# Patient Record
Sex: FEMALE | Race: WHITE | NOT HISPANIC OR LATINO | Employment: OTHER | URBAN - METROPOLITAN AREA
[De-identification: names, ages, dates, MRNs, and addresses within clinical notes are randomized per-mention and may not be internally consistent; named-entity substitution may affect disease eponyms.]

---

## 2023-04-10 LAB
ALANINE AMINOTRANSFERASE (SGPT) (U/L) IN SER/PLAS: 17 U/L (ref 7–45)
ANION GAP IN SER/PLAS: 11 MMOL/L (ref 10–20)
CALCIDIOL (25 OH VITAMIN D3) (NG/ML) IN SER/PLAS: 47 NG/ML
CALCIUM (MG/DL) IN SER/PLAS: 9.9 MG/DL (ref 8.6–10.3)
CARBON DIOXIDE, TOTAL (MMOL/L) IN SER/PLAS: 28 MMOL/L (ref 21–32)
CHLORIDE (MMOL/L) IN SER/PLAS: 104 MMOL/L (ref 98–107)
CHOLESTEROL (MG/DL) IN SER/PLAS: 144 MG/DL (ref 0–199)
CHOLESTEROL IN HDL (MG/DL) IN SER/PLAS: 68 MG/DL
CHOLESTEROL/HDL RATIO: 2.1
CREATININE (MG/DL) IN SER/PLAS: 0.9 MG/DL (ref 0.5–1.05)
ERYTHROCYTE DISTRIBUTION WIDTH (RATIO) BY AUTOMATED COUNT: 13 % (ref 11.5–14.5)
ERYTHROCYTE MEAN CORPUSCULAR HEMOGLOBIN CONCENTRATION (G/DL) BY AUTOMATED: 31.9 G/DL (ref 32–36)
ERYTHROCYTE MEAN CORPUSCULAR VOLUME (FL) BY AUTOMATED COUNT: 94 FL (ref 80–100)
ERYTHROCYTES (10*6/UL) IN BLOOD BY AUTOMATED COUNT: 4.04 X10E12/L (ref 4–5.2)
GFR FEMALE: 63 ML/MIN/1.73M2
GLUCOSE (MG/DL) IN SER/PLAS: 98 MG/DL (ref 74–99)
HEMATOCRIT (%) IN BLOOD BY AUTOMATED COUNT: 37.9 % (ref 36–46)
HEMOGLOBIN (G/DL) IN BLOOD: 12.1 G/DL (ref 12–16)
LDL: 62 MG/DL (ref 0–99)
LEUKOCYTES (10*3/UL) IN BLOOD BY AUTOMATED COUNT: 7.1 X10E9/L (ref 4.4–11.3)
PLATELETS (10*3/UL) IN BLOOD AUTOMATED COUNT: 165 X10E9/L (ref 150–450)
POTASSIUM (MMOL/L) IN SER/PLAS: 4.2 MMOL/L (ref 3.5–5.3)
SODIUM (MMOL/L) IN SER/PLAS: 139 MMOL/L (ref 136–145)
THYROTROPIN (MIU/L) IN SER/PLAS BY DETECTION LIMIT <= 0.05 MIU/L: 0.57 MIU/L (ref 0.44–3.98)
TRIGLYCERIDE (MG/DL) IN SER/PLAS: 70 MG/DL (ref 0–149)
UREA NITROGEN (MG/DL) IN SER/PLAS: 14 MG/DL (ref 6–23)
VLDL: 14 MG/DL (ref 0–40)

## 2023-04-18 ENCOUNTER — OFFICE VISIT (OUTPATIENT)
Dept: PRIMARY CARE | Facility: CLINIC | Age: 83
End: 2023-04-18
Payer: MEDICARE

## 2023-04-18 VITALS
HEART RATE: 61 BPM | TEMPERATURE: 97.7 F | OXYGEN SATURATION: 98 % | DIASTOLIC BLOOD PRESSURE: 70 MMHG | WEIGHT: 122.8 LBS | BODY MASS INDEX: 21.41 KG/M2 | RESPIRATION RATE: 16 BRPM | SYSTOLIC BLOOD PRESSURE: 125 MMHG

## 2023-04-18 DIAGNOSIS — D69.6 THROMBOCYTOPENIA (CMS-HCC): ICD-10-CM

## 2023-04-18 DIAGNOSIS — N18.31 CHRONIC KIDNEY DISEASE, STAGE 3A (MULTI): ICD-10-CM

## 2023-04-18 DIAGNOSIS — I49.5 BRADY-TACHY SYNDROME (MULTI): ICD-10-CM

## 2023-04-18 PROBLEM — H61.20 CERUMEN IMPACTION: Status: ACTIVE | Noted: 2023-04-18

## 2023-04-18 PROBLEM — M21.42 FALLEN ARCHES: Status: ACTIVE | Noted: 2023-04-18

## 2023-04-18 PROBLEM — I10 HYPERTENSION: Status: ACTIVE | Noted: 2023-04-18

## 2023-04-18 PROBLEM — K21.9 ACID REFLUX: Status: ACTIVE | Noted: 2023-04-18

## 2023-04-18 PROBLEM — R09.82 POSTNASAL DRIP: Status: ACTIVE | Noted: 2023-04-18

## 2023-04-18 PROBLEM — R07.9 CHEST PAIN SYNDROME: Status: ACTIVE | Noted: 2023-04-18

## 2023-04-18 PROBLEM — I51.7 CARDIOMEGALY: Status: ACTIVE | Noted: 2023-04-18

## 2023-04-18 PROBLEM — G47.00 SLEEPLESSNESS: Status: ACTIVE | Noted: 2023-04-18

## 2023-04-18 PROBLEM — I34.0 MILD MITRAL REGURGITATION BY PRIOR ECHOCARDIOGRAM: Status: ACTIVE | Noted: 2023-04-18

## 2023-04-18 PROBLEM — K80.20 CHOLELITHIASIS: Status: ACTIVE | Noted: 2023-04-18

## 2023-04-18 PROBLEM — E78.5 HYPERLIPIDEMIA: Status: ACTIVE | Noted: 2023-04-18

## 2023-04-18 PROBLEM — F41.8 SITUATIONAL ANXIETY: Status: ACTIVE | Noted: 2023-04-18

## 2023-04-18 PROBLEM — M15.1 HEBERDEN NODES: Status: ACTIVE | Noted: 2023-04-18

## 2023-04-18 PROBLEM — M21.41 FALLEN ARCHES: Status: ACTIVE | Noted: 2023-04-18

## 2023-04-18 PROBLEM — I35.1 MODERATE AORTIC REGURGITATION: Status: ACTIVE | Noted: 2023-04-18

## 2023-04-18 PROBLEM — R19.4 BOWEL HABIT CHANGES: Status: ACTIVE | Noted: 2023-04-18

## 2023-04-18 PROBLEM — D64.9 ANEMIA: Status: ACTIVE | Noted: 2023-04-18

## 2023-04-18 PROBLEM — H60.92 OTITIS EXTERNA, LEFT: Status: ACTIVE | Noted: 2023-04-18

## 2023-04-18 PROBLEM — J06.9 ACUTE UPPER RESPIRATORY INFECTION: Status: ACTIVE | Noted: 2023-04-18

## 2023-04-18 PROBLEM — E03.9 HYPOTHYROIDISM: Status: ACTIVE | Noted: 2023-04-18

## 2023-04-18 PROBLEM — C44.90 SKIN CANCER: Status: ACTIVE | Noted: 2023-04-18

## 2023-04-18 PROBLEM — R26.89: Status: ACTIVE | Noted: 2023-04-18

## 2023-04-18 PROBLEM — I51.89 MILD LEFT VENTRICULAR SYSTOLIC DYSFUNCTION: Status: ACTIVE | Noted: 2023-04-18

## 2023-04-18 PROBLEM — M25.552 PAIN OF LEFT HIP JOINT: Status: ACTIVE | Noted: 2023-04-18

## 2023-04-18 PROBLEM — E55.9 VITAMIN D DEFICIENCY: Status: ACTIVE | Noted: 2023-04-18

## 2023-04-18 PROCEDURE — 3074F SYST BP LT 130 MM HG: CPT | Performed by: INTERNAL MEDICINE

## 2023-04-18 PROCEDURE — 1159F MED LIST DOCD IN RCRD: CPT | Performed by: INTERNAL MEDICINE

## 2023-04-18 PROCEDURE — 1036F TOBACCO NON-USER: CPT | Performed by: INTERNAL MEDICINE

## 2023-04-18 PROCEDURE — 99214 OFFICE O/P EST MOD 30 MIN: CPT | Performed by: INTERNAL MEDICINE

## 2023-04-18 PROCEDURE — 3078F DIAST BP <80 MM HG: CPT | Performed by: INTERNAL MEDICINE

## 2023-04-18 PROCEDURE — 1160F RVW MEDS BY RX/DR IN RCRD: CPT | Performed by: INTERNAL MEDICINE

## 2023-04-18 RX ORDER — AMIODARONE HYDROCHLORIDE 200 MG/1
100 TABLET ORAL DAILY
COMMUNITY

## 2023-04-18 RX ORDER — NITROGLYCERIN 0.3 MG/1
TABLET SUBLINGUAL
COMMUNITY
Start: 2022-07-27

## 2023-04-18 RX ORDER — ACETAMINOPHEN 500 MG
TABLET ORAL
COMMUNITY
Start: 2013-04-12

## 2023-04-18 RX ORDER — LEVOTHYROXINE SODIUM 75 UG/1
TABLET ORAL
COMMUNITY
End: 2023-04-26

## 2023-04-18 RX ORDER — ACETAMINOPHEN AND CODEINE PHOSPHATE 300; 30 MG/1; MG/1
1 TABLET ORAL EVERY 4 HOURS PRN
COMMUNITY
Start: 2022-11-25 | End: 2023-07-13 | Stop reason: ALTCHOICE

## 2023-04-18 RX ORDER — ATORVASTATIN CALCIUM 40 MG/1
40 TABLET, FILM COATED ORAL DAILY
COMMUNITY
End: 2023-08-21

## 2023-04-18 RX ORDER — BUPROPION HYDROCHLORIDE 150 MG/1
TABLET ORAL
COMMUNITY
Start: 2022-12-20 | End: 2023-07-13 | Stop reason: ALTCHOICE

## 2023-04-18 RX ORDER — FUROSEMIDE 20 MG/1
TABLET ORAL
COMMUNITY
End: 2023-09-22 | Stop reason: SDUPTHER

## 2023-04-18 RX ORDER — AMOXICILLIN 500 MG/1
1 CAPSULE ORAL
COMMUNITY
Start: 2022-11-25 | End: 2023-07-20 | Stop reason: ALTCHOICE

## 2023-04-18 RX ORDER — HYDROCODONE BITARTRATE AND ACETAMINOPHEN 5; 325 MG/1; MG/1
1 TABLET ORAL EVERY 4 HOURS PRN
COMMUNITY
Start: 2022-11-09 | End: 2023-09-27 | Stop reason: ALTCHOICE

## 2023-04-18 RX ORDER — LABETALOL 200 MG/1
TABLET, FILM COATED ORAL
COMMUNITY
End: 2023-07-13 | Stop reason: SDUPTHER

## 2023-04-18 RX ORDER — FLUTICASONE PROPIONATE 50 MCG
1 SPRAY, SUSPENSION (ML) NASAL DAILY PRN
COMMUNITY

## 2023-04-18 RX ORDER — AMOXICILLIN AND CLAVULANATE POTASSIUM 875; 125 MG/1; MG/1
1 TABLET, FILM COATED ORAL 2 TIMES DAILY
Qty: 10 TABLET | Refills: 0 | COMMUNITY
Start: 2022-11-06 | End: 2022-11-11

## 2023-04-18 RX ORDER — LISINOPRIL 20 MG/1
TABLET ORAL
COMMUNITY
End: 2023-06-22

## 2023-04-18 RX ORDER — ASCORBIC ACID 500 MG
2 TABLET ORAL DAILY
COMMUNITY
Start: 2020-10-01

## 2023-04-18 RX ORDER — MELATONIN 3 MG
1 CAPSULE ORAL NIGHTLY
COMMUNITY
Start: 2020-10-01

## 2023-04-18 ASSESSMENT — PATIENT HEALTH QUESTIONNAIRE - PHQ9
1. LITTLE INTEREST OR PLEASURE IN DOING THINGS: NOT AT ALL
2. FEELING DOWN, DEPRESSED OR HOPELESS: NOT AT ALL
SUM OF ALL RESPONSES TO PHQ9 QUESTIONS 1 AND 2: 0

## 2023-04-18 ASSESSMENT — ENCOUNTER SYMPTOMS
DEPRESSION: 0
LOSS OF SENSATION IN FEET: 0
OCCASIONAL FEELINGS OF UNSTEADINESS: 0

## 2023-04-18 NOTE — PROGRESS NOTES
Subjective   Patient ID: Aleena Bruce is a 83 y.o. female who presents for Follow-up.    Here for follow up   Hearing a bit worse  No chest pain  Walking dogs in her neighborhood       Review of Systems    Objective   /70 (BP Location: Left arm, Patient Position: Sitting)   Pulse 61   Temp 36.5 °C (97.7 °F)   Resp 16   Wt 55.7 kg (122 lb 12.8 oz)   SpO2 98%   BMI 21.41 kg/m²     Physical Exam  Vitals reviewed.   Constitutional:       Appearance: Normal appearance.   HENT:      Head: Normocephalic and atraumatic.   Eyes:      General: No scleral icterus.        Right eye: No discharge.         Left eye: No discharge.      Extraocular Movements: Extraocular movements intact.      Conjunctiva/sclera: Conjunctivae normal.      Pupils: Pupils are equal, round, and reactive to light.   Cardiovascular:      Rate and Rhythm: Normal rate and regular rhythm.      Pulses: Normal pulses.      Heart sounds: Normal heart sounds. No murmur heard.  Pulmonary:      Effort: Pulmonary effort is normal.      Breath sounds: Normal breath sounds. No wheezing or rhonchi.   Musculoskeletal:         General: No deformity or signs of injury. Normal range of motion.      Cervical back: Normal range of motion and neck supple. No rigidity or tenderness.   Lymphadenopathy:      Cervical: No cervical adenopathy.   Skin:     General: Skin is warm and dry.      Findings: No rash.   Neurological:      General: No focal deficit present.      Mental Status: She is alert and oriented to person, place, and time. Mental status is at baseline.      Cranial Nerves: No cranial nerve deficit.      Sensory: No sensory deficit.      Gait: Gait normal.   Psychiatric:         Mood and Affect: Mood normal.         Behavior: Behavior normal.         Thought Content: Thought content normal.         Judgment: Judgment normal.       Assessment/Plan   Problem List Items Addressed This Visit    None       Living situation-she lives with her 2 grown  children, her son and daughter, who coincidentally have both lost their spouses. She enjoys walking her dogs daily. She remains independent continues to drive. She typically takes a 2-hour nap after lunch.  Still has 1 dog            She walks several dogs for her neighbors     Chest pain syndrome s/p ER evaluation 7/26/22 - Recent chest pains. Visited the ER. All tests were negative. She is scheduled to do a coronary angiogram.            Negative cardiac eval in 2022.            will see her cardiologist in 11/23     Hypertension- labile/dyslipidemia- Improved upon recheck. Functionally doing very well without dizziness or chest pain. Stable of late. no orthostasis. will check labs regularly     Fitness routine - she walks her dog, 2-3 walks daily, about 25 min in the am, and then 2 shorter walks. She walks year round.S he continues walking several dogs around the neighborhood daily.      Tachybradycardia syndrome/valvular heart disease-she will continue annual visits and cardiology with Dr. Segundo Clark. She remains asymptomatic, without any dizziness or syncopal episodes which were occurring after urination. Echo and labs followed she notes.    No recent cardiac symptoms walking routine walking dogs quite good daily. Stable symptoms. she'll follow with him each December. Unremarkable CT coronary artery angiogram and nuclear stress test 10/22        Mild chronic kidney disease-stage IIIA- we will continue to follow. Stable     Nutritional concerns-weight is down several pounds this spring but she is avoiding salt and overall eating better she states     Acid reflux / dyspepsia - Improved. Off meds. Not active issue.   Occasionally problematic w/ accompanying dyspepsia. Using Pepcid AC for relief- perhaps once weekly       Cholelithiasis- saw Dr. Santana 3/18 after single episode of cholelithiasis. Rather than surgery, they've opted to meet again in 6 months obviously sooner with symptoms. No recent symptoms she  notes. 2018 abdominal ultrasound report reviewed-simple cholelithiasis- asymptomatic follow   no postprandial symptoms     Liver Benign cyst incidentally noted on ultrasound.     Balance concerns - more of a concern to her. PT recommended/ordered last time. Presently doing well, walking mult.dogs mult. times daily. Overall improved. She uses a special collar on her younger dog, and trakx boot covers in ice.             She will continue fall prevention    Musculoskeletal chest pain-discussed at her  visit-she really does not do any upper body workouts-reviewed the anatomy, and the need to begin a upper body stretching routine several days weekly     Left hip pain/left knee crepitance- caution with walking with her new dog   She appears to be developing a bit of a valgus deformity suggestive of advanced left knee arthritis. She has no pain, so we will continue to follow. Again extreme caution walking remains a priority she understands.     Hand arthritis-advanced-she still enjoys gardening and chores around the house     Hypothyroidism-she will continue her Synthroid and check thyroid labs at least semiannually     Thrombocytopenia- stable we will follow     Seasonal affective disorder/Situational depression- spouse  in 2015; Has a great set of friends. Busy walking dogs in neighborhood. SSRI helpful-but she had to stop the Lexapro with loose bowel movements. Wellbutrin tried but this seemed to cause memory issues so she stopped it.      She called mid winter with issues with anxiety and feelings of depression-Lexapro suggested but she stopped it after a week or so.  It did not agree with her.  She is doing much better.  Enjoys springtime, walking her dogs, and enjoys her neighbors     GYN care/Annual mammograms / family Hx breast cancer-she will continue at least until age 78 each winter per GYN- at [facility]. She now follows w/ Dr. Ventura's successor after he retired at [facility], [place] each . who  orders her mammo then too.     Annual mammogram - each winter at [facility]. updated Mar '21. She will be 83 soon-we will discontinue at this point     Bone density concerns - per GYN several yrs ago OK. no family Hx osteoporosis. she's not inclined to repeat.     Bowel movements - change in frequency and calibre. Accompanying occasional LLQ pains. Recommended f/u with Gastro and request a CT colonography.     Colon cancer screening- colonoscopy normal 2007. She is over 78 so this will not need to be repeated unless she has symptoms.   She is currently experiencing bowel disruptions and discomfort. She is unwilling to do a colonoscopy. She has cancelled her last appointment. Suggested a CT colonography. She will f/u w/ Gastro at her next appt in 8/22.     Vitamin D deficiency/Bone density concerns-she refuses further evaluation or treatment for osteoporosis. She will continue the vitamin D however.     Sleeplessness / anxiety - Doing well presently w/ melatonin nightly        Cerumen care- She will continue follow-up with concerns     History of skin cancer-she follows with Dr. Rodríguez now twice a year. Appt. in Dec.     Hx bilateral cataracts w/ lenses - she'll consider an eye exam , as she has reading glasses, and it's been a few yrs     Dental care - she plans to f/u w/ concerns. Needs to get in.     TMJ joint pain w/ poor dentition- ongoing pain for approximately 2 months. 3 extractions completed. Intractable TMJ pains. She intends to f/u with an oral surgeon who is a friend. Recommended TMJ therapy exercises.     Hearing loss-she admits that her kids say that the TV is pretty loud.           Considering taking out options at [employer]     Flu shot each fall. updated 10/22     Covid series completed- w/ booster too. Additional booster shot updated 10/22.     Shingrix series completed     Follow-up in 5 months, sooner as needed

## 2023-04-18 NOTE — PROGRESS NOTES
Subjective   Patient ID: Aleena Bruce is a 83 y.o. female who presents for Follow-up.    HPI     Review of Systems    Objective   /70 (BP Location: Left arm, Patient Position: Sitting)   Pulse 61   Temp 36.5 °C (97.7 °F)   Resp 16   Wt 55.7 kg (122 lb 12.8 oz)   SpO2 98%   BMI 21.41 kg/m²     Physical Exam    Assessment/Plan

## 2023-04-25 DIAGNOSIS — E03.8 OTHER SPECIFIED HYPOTHYROIDISM: Primary | ICD-10-CM

## 2023-04-26 RX ORDER — LEVOTHYROXINE SODIUM 75 UG/1
75 TABLET ORAL
Qty: 90 TABLET | Refills: 0 | Status: SHIPPED | OUTPATIENT
Start: 2023-04-26 | End: 2023-05-01

## 2023-04-27 DIAGNOSIS — H91.90 HEARING LOSS, UNSPECIFIED HEARING LOSS TYPE, UNSPECIFIED LATERALITY: Primary | ICD-10-CM

## 2023-05-01 DIAGNOSIS — E03.8 OTHER SPECIFIED HYPOTHYROIDISM: ICD-10-CM

## 2023-05-01 RX ORDER — LEVOTHYROXINE SODIUM 75 UG/1
75 TABLET ORAL
Qty: 90 TABLET | Refills: 1 | Status: SHIPPED | OUTPATIENT
Start: 2023-05-01 | End: 2023-11-09 | Stop reason: SDUPTHER

## 2023-06-22 DIAGNOSIS — I10 ESSENTIAL HYPERTENSION WITH GOAL BLOOD PRESSURE LESS THAN 130/85: Primary | ICD-10-CM

## 2023-06-22 RX ORDER — LISINOPRIL 20 MG/1
20 TABLET ORAL DAILY
Qty: 180 TABLET | Refills: 3 | Status: SHIPPED | OUTPATIENT
Start: 2023-06-22 | End: 2023-07-20 | Stop reason: SDUPTHER

## 2023-07-07 ENCOUNTER — DOCUMENTATION (OUTPATIENT)
Dept: PRIMARY CARE | Facility: CLINIC | Age: 83
End: 2023-07-07
Payer: MEDICARE

## 2023-07-07 ENCOUNTER — TELEPHONE (OUTPATIENT)
Dept: PRIMARY CARE | Facility: CLINIC | Age: 83
End: 2023-07-07
Payer: MEDICARE

## 2023-07-07 NOTE — TELEPHONE ENCOUNTER
Pt had fainting accident 2 weeks ago - broke hip - has been pinned at [facility] - will be discharged from rehab today - needs an f/u  within the next 2 weeks - please advise if she can be added to schedule or if ok to schedule with Dr Whitaker or Rocio.

## 2023-07-10 ENCOUNTER — PATIENT OUTREACH (OUTPATIENT)
Dept: CARE COORDINATION | Facility: CLINIC | Age: 83
End: 2023-07-10
Payer: MEDICARE

## 2023-07-10 RX ORDER — DIBASIC SODIUM PHOSPHATE, MONOBASIC POTASSIUM PHOSPHATE AND MONOBASIC SODIUM PHOSPHATE 852; 155; 130 MG/1; MG/1; MG/1
1 TABLET ORAL 4 TIMES DAILY
COMMUNITY
End: 2023-10-27 | Stop reason: ALTCHOICE

## 2023-07-10 RX ORDER — OXYCODONE HYDROCHLORIDE 5 MG/1
5 TABLET ORAL EVERY 8 HOURS PRN
COMMUNITY
Start: 2023-07-06 | End: 2023-09-27 | Stop reason: ALTCHOICE

## 2023-07-10 RX ORDER — ENOXAPARIN SODIUM 100 MG/ML
30 INJECTION SUBCUTANEOUS DAILY
COMMUNITY
End: 2023-07-13 | Stop reason: ALTCHOICE

## 2023-07-10 RX ORDER — LISINOPRIL 20 MG/1
20 TABLET ORAL 2 TIMES DAILY
COMMUNITY
Start: 2015-12-21 | End: 2023-12-13 | Stop reason: SDUPTHER

## 2023-07-10 NOTE — PROGRESS NOTES
Discharge Facility: [facility]  Discharge Diagnosis: rehab following L femur fracture and repair  Admission Date:6/28/23  Discharge Date: 7/7/23    PCP Appointment Date: 7/20/23  Specialist Appointment Date: 7/11/23  Hospital Encounter and Summary: Linked   See discharge assessment below for further details    Engagement  Call Start Time: 1423 (7/10/2023  2:34 PM)    Medications  Medications reviewed with patient/caregiver?: Yes (7/10/2023  2:34 PM)  Is the patient having any side effects they believe may be caused by any medication additions or changes?: No (7/10/2023  2:34 PM)  Does the patient have all medications ordered at discharge?: Yes (7/10/2023  2:34 PM)  Care Management Interventions: No intervention needed (7/10/2023  2:34 PM)  Prescription Comments: back on lisinopril 20mg twice daily, off lasix, on lovenox through Wed, on phosphorus supplement (7/10/2023  2:34 PM)  Is the patient taking all medications as directed (includes completed medication regime)?: Yes (7/10/2023  2:34 PM)    Appointments  Does the patient have a primary care provider?: Yes (7/10/2023  2:34 PM)  Care Management Interventions: Verified appointment date/time/provider (7/10/2023  2:34 PM)  Has the patient kept scheduled appointments due by today?: Yes (7/10/2023  2:34 PM)  Care Management Interventions: Educated on importance of keeping appointment (7/10/2023  2:34 PM)    Self Management  What Durable Medical Equipment (DME) was ordered?: walker and bedside commode (7/10/2023  2:34 PM)  Has all Durable Medical Equipment (DME) been delivered?: Yes (7/10/2023  2:34 PM)    Patient Teaching  Does the patient have access to their discharge instructions?: Yes (7/10/2023  2:34 PM)  Care Management Interventions: Reviewed instructions with patient (7/10/2023  2:34 PM)  What is the patient's perception of their health status since discharge?: Improving (7/10/2023  2:34 PM)  Is the patient/caregiver able to teach back  the hierarchy of who to call/visit for symptoms/problems? PCP, Specialist, Home Health nurse, Urgent Care, ED, 911: Yes (7/10/2023  2:34 PM)    Wrap Up  Wrap Up Additional Comments: Josef is doing well awaiting to be set up with outpatient PTOT she will call by Wed if she has not heard from them. Has assistance at home. Notes inpatient states to avoid midodrine and consider stockings and abd binder if dizziness/syncope reoccurs. (7/10/2023  2:34 PM)  Call End Time: 1430 (7/10/2023  2:34 PM)

## 2023-07-11 NOTE — TELEPHONE ENCOUNTER
Pt wanted to speak with Dr. Rose before appointment. She is concerned about BP- today current reading was 222/87  Pt didn't know what to do about it?    Please call pt at [phone]

## 2023-07-11 NOTE — TELEPHONE ENCOUNTER
Patient got in touch with Cardiologist Dr. Clark, and due to her current BP readings (222/87), was advised to go to ER. Patient will be going to ER in [facility].

## 2023-07-12 NOTE — TELEPHONE ENCOUNTER
Pt is asking to speak with Dr Rose personally in regards to her BP concerns. It is elevated again. Please advise she has a change in phone numbers. Thank you!

## 2023-07-13 RX ORDER — LABETALOL 200 MG/1
TABLET, FILM COATED ORAL
COMMUNITY
Start: 2023-07-13 | End: 2023-07-20 | Stop reason: DRUGHIGH

## 2023-07-13 NOTE — TELEPHONE ENCOUNTER
Spoke with patient earlier this afternoon who will follow her blood pressure twice daily and if her systolic pressure is over 150 she will take an extra half labetalol.  She will follow-up with me next week as scheduled.  She has had recent difficulties with labile blood pressure readings, recently.  She will use caution standing and again follow her blood pressure twice daily she will follow-up next Thursday

## 2023-07-20 ENCOUNTER — OFFICE VISIT (OUTPATIENT)
Dept: PRIMARY CARE | Facility: CLINIC | Age: 83
End: 2023-07-20
Payer: MEDICARE

## 2023-07-20 VITALS
SYSTOLIC BLOOD PRESSURE: 170 MMHG | TEMPERATURE: 98.4 F | RESPIRATION RATE: 16 BRPM | DIASTOLIC BLOOD PRESSURE: 73 MMHG | WEIGHT: 118.2 LBS | HEART RATE: 66 BPM | OXYGEN SATURATION: 97 % | BODY MASS INDEX: 20.94 KG/M2

## 2023-07-20 DIAGNOSIS — I10 LABILE ESSENTIAL HYPERTENSION: ICD-10-CM

## 2023-07-20 DIAGNOSIS — S72.012A: ICD-10-CM

## 2023-07-20 DIAGNOSIS — Z78.0 POSTMENOPAUSAL STATE: Primary | ICD-10-CM

## 2023-07-20 DIAGNOSIS — G89.11 ACUTE TRAUMATIC PAIN: ICD-10-CM

## 2023-07-20 DIAGNOSIS — S72.002A HIP FRACTURE, LEFT, CLOSED, INITIAL ENCOUNTER (MULTI): ICD-10-CM

## 2023-07-20 DIAGNOSIS — M15.9 OSTEOARTHRITIS OF MULTIPLE JOINTS, UNSPECIFIED OSTEOARTHRITIS TYPE: ICD-10-CM

## 2023-07-20 PROBLEM — D62 ACUTE POSTOPERATIVE ANEMIA DUE TO EXPECTED BLOOD LOSS: Status: ACTIVE | Noted: 2023-06-27

## 2023-07-20 PROBLEM — S00.03XA HEMATOMA OF SCALP: Status: ACTIVE | Noted: 2023-06-29

## 2023-07-20 PROBLEM — H93.12 TINNITUS OF LEFT EAR: Status: ACTIVE | Noted: 2023-07-20

## 2023-07-20 PROBLEM — H90.3 ASYMMETRIC SNHL (SENSORINEURAL HEARING LOSS): Status: ACTIVE | Noted: 2023-07-20

## 2023-07-20 PROBLEM — M19.90 OSTEOARTHRITIS: Status: ACTIVE | Noted: 2020-03-09
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
## 2023-07-20 PROBLEM — E83.39 HYPOPHOSPHATEMIA: Status: ACTIVE | Noted: 2023-06-28

## 2023-07-20 PROBLEM — S76.319A HAMSTRING STRAIN: Status: ACTIVE | Noted: 2018-05-09

## 2023-07-20 PROCEDURE — 1126F AMNT PAIN NOTED NONE PRSNT: CPT | Performed by: INTERNAL MEDICINE

## 2023-07-20 PROCEDURE — 3078F DIAST BP <80 MM HG: CPT | Performed by: INTERNAL MEDICINE

## 2023-07-20 PROCEDURE — 1036F TOBACCO NON-USER: CPT | Performed by: INTERNAL MEDICINE

## 2023-07-20 PROCEDURE — 3077F SYST BP >= 140 MM HG: CPT | Performed by: INTERNAL MEDICINE

## 2023-07-20 PROCEDURE — 99495 TRANSJ CARE MGMT MOD F2F 14D: CPT | Performed by: INTERNAL MEDICINE

## 2023-07-20 PROCEDURE — 1160F RVW MEDS BY RX/DR IN RCRD: CPT | Performed by: INTERNAL MEDICINE

## 2023-07-20 PROCEDURE — 1159F MED LIST DOCD IN RCRD: CPT | Performed by: INTERNAL MEDICINE

## 2023-07-20 RX ORDER — LABETALOL 200 MG/1
300 TABLET, FILM COATED ORAL 2 TIMES DAILY
Status: SHIPPED | COMMUNITY
Start: 2023-07-20 | End: 2023-07-31 | Stop reason: SDUPTHER

## 2023-07-20 RX ORDER — DEXTROMETHORPHAN HYDROBROMIDE, GUAIFENESIN 5; 100 MG/5ML; MG/5ML
1300 LIQUID ORAL 2 TIMES DAILY PRN
Qty: 30 TABLET | Refills: 0 | COMMUNITY
Start: 2023-07-20

## 2023-07-20 ASSESSMENT — ENCOUNTER SYMPTOMS
OCCASIONAL FEELINGS OF UNSTEADINESS: 1
LOSS OF SENSATION IN FEET: 0
DEPRESSION: 0

## 2023-07-20 ASSESSMENT — PATIENT HEALTH QUESTIONNAIRE - PHQ9
SUM OF ALL RESPONSES TO PHQ9 QUESTIONS 1 AND 2: 0
1. LITTLE INTEREST OR PLEASURE IN DOING THINGS: NOT AT ALL
2. FEELING DOWN, DEPRESSED OR HOPELESS: NOT AT ALL

## 2023-07-20 NOTE — PROGRESS NOTES
"Patient: Aleena Bruce  : 1940  PCP: Checo Rose MD  MRN: [id number]  Program: No linked episodes     Aleena Bruce is a 83 y.o. female presenting today for follow-up after being discharged from the hospital 13 days ago. The main problem requiring admission was acute rehab after left hip fracture in [place] after a fall.  The discharge summary and/or Transitional Care Management documentation was reviewed. Medication reconciliation was performed as indicated via the \"Mikie as Reviewed\" timestamp.     Aleena Bruce was contacted by Transitional Care Management services two days after her discharge. This encounter and supporting documentation was reviewed.    The complexity of medical decision making for this patient's transitional care is moderate.    Physical Exam  Vitals reviewed.   Constitutional:       Appearance: Normal appearance.      Comments: Well-appearing female, appearing younger than her stated age ambulating easily with a walker   HENT:      Head: Normocephalic and atraumatic.   Eyes:      General: No scleral icterus.        Right eye: No discharge.         Left eye: No discharge.      Extraocular Movements: Extraocular movements intact.      Conjunctiva/sclera: Conjunctivae normal.      Pupils: Pupils are equal, round, and reactive to light.   Cardiovascular:      Rate and Rhythm: Normal rate and regular rhythm.      Pulses: Normal pulses.      Heart sounds: Murmur heard.   Pulmonary:      Effort: Pulmonary effort is normal.      Breath sounds: Normal breath sounds. No wheezing or rhonchi.   Musculoskeletal:         General: No deformity or signs of injury. Normal range of motion.      Cervical back: Normal range of motion and neck supple. No rigidity or tenderness.   Lymphadenopathy:      Cervical: No cervical adenopathy.   Skin:     General: Skin is warm and dry.      Findings: No rash.   Neurological:      General: No focal deficit present.      Mental Status: She is alert and oriented to " person, place, and time. Mental status is at baseline.      Cranial Nerves: No cranial nerve deficit.      Sensory: No sensory deficit.      Gait: Gait normal.   Psychiatric:         Mood and Affect: Mood normal.         Behavior: Behavior normal.         Thought Content: Thought content normal.         Judgment: Judgment normal.         Assessment/Plan   Problem List Items Addressed This Visit       Acute traumatic pain    Hip fracture, left, closed, initial encounter (CMS/[facility])    Osteoarthritis    Unspecified intracapsular fracture of left femur, initial encounter for closed fracture (CMS/[facility])     Other Visit Diagnoses       Postmenopausal state    -  Primary    Relevant Orders    XR DEXA bone density    Labile essential hypertension                Review of Systems    No family history on file.    Engagement  Call Start Time: 1423 (7/10/2023  2:34 PM)    Medications  Medications reviewed with patient/caregiver?: Yes (7/10/2023  2:34 PM)  Is the patient having any side effects they believe may be caused by any medication additions or changes?: No (7/10/2023  2:34 PM)  Does the patient have all medications ordered at discharge?: Yes (7/10/2023  2:34 PM)  Care Management Interventions: No intervention needed (7/10/2023  2:34 PM)  Prescription Comments: back on lisinopril 20mg twice daily, off lasix, on lovenox through Wed, on phosphorus supplement (7/10/2023  2:34 PM)  Is the patient taking all medications as directed (includes completed medication regime)?: Yes (7/10/2023  2:34 PM)    Appointments  Does the patient have a primary care provider?: Yes (7/10/2023  2:34 PM)  Care Management Interventions: Verified appointment date/time/provider (7/10/2023  2:34 PM)  Has the patient kept scheduled appointments due by today?: Yes (7/10/2023  2:34 PM)  Care Management Interventions: Educated on importance of keeping appointment (7/10/2023  2:34 PM)    Self Management  What Durable Medical Equipment (DME) was ordered?:  walker and bedside commode (7/10/2023  2:34 PM)  Has all Durable Medical Equipment (DME) been delivered?: Yes (7/10/2023  2:34 PM)    Patient Teaching  Does the patient have access to their discharge instructions?: Yes (7/10/2023  2:34 PM)  Care Management Interventions: Reviewed instructions with patient (7/10/2023  2:34 PM)  What is the patient's perception of their health status since discharge?: Improving (7/10/2023  2:34 PM)  Is the patient/caregiver able to teach back the hierarchy of who to call/visit for symptoms/problems? PCP, Specialist, Home Health nurse, Urgent Care, ED, 911: Yes (7/10/2023  2:34 PM)    Wrap Up  Wrap Up Additional Comments: Josef is doing well awaiting to be set up with outpatient PTOT she will call by Wed if she has not heard from them. Has assistance at home. Notes inpatient states to avoid midodrine and consider stockings and abd binder if dizziness/syncope reoccurs. (7/10/2023  2:34 PM)  Call End Time: 1430 (7/10/2023  2:34 PM)        No follow-ups on file.    Patient was recently discharged from an inpatient facility. Discharge medication list reviewed and reconciled with her electronic medical record medication list. Discharge orders reviewed, and we'll continue to follow active medical problems as outlined in this note.    Syncopal episode-while prolonged standing in [place], with her hands elevated-she will review with her cardiologist Dr. Clark at her appointment next week    Left hip fracture-following fall in [place] 6/26/2023.  Uncomplicated postop course so far.  She will follow-up with orthopedics as planned    Rehab-she was admitted to rehab 6/28/2023, discharged 7/7/2023, 13 days ago.  She is using a walker.  She will continue her PT at [facility]    Labile hypertension-she will continue labetalol 1 and half pills twice daily and review with Dr. Clark next week.  She will get a new home blood pressure machine and check her blood pressure midday and call if consistently over  135 systolic.    Decreased left ear hearing-she saw Dr. 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  At this point she states that hearing aid will help amplify this and so limited options     Living situation-she lives with her 2 grown children, her son and daughter, who coincidentally have both lost their spouses. She enjoys walking her dogs daily. She remains independent continues to drive. She typically takes a 2-hour nap after lunch.  Still has 1 dog            She walks several dogs for her neighbors     Chest pain syndrome s/p ER evaluation 7/26/22 - Recent chest pains. Visited the ER. All tests were negative. She is scheduled to do a coronary angiogram.            Negative cardiac eval in 2022.            will see her cardiologist in 11/23     Hypertension- labile/dyslipidemia- Improved upon recheck. Functionally doing very well without dizziness or chest pain. Stable of late. no orthostasis. will check labs regularly     Fitness routine - she walks her dog, 2-3 walks daily, about 25 min in the am, and then 2 shorter walks. She walks year round.S he continues walking several dogs around the neighborhood daily.      Tachybradycardia syndrome/valvular heart disease-she will continue annual visits and cardiology with Dr. Segundo Clark. She remains asymptomatic, without any dizziness or syncopal episodes which were occurring after urination. Echo and labs followed she notes.    No recent cardiac symptoms walking routine walking dogs quite good daily. Stable symptoms. she'll follow with him each December. Unremarkable CT coronary artery angiogram and nuclear stress test 10/22        Mild chronic kidney disease-stage IIIA- we will continue to follow. Stable     Nutritional concerns-weight is down several pounds this spring but she is avoiding salt and overall eating better she states     Acid reflux / dyspepsia - Improved. Off meds. Not active issue.   Occasionally problematic w/ accompanying  dyspepsia. Using Pepcid AC for relief- perhaps once weekly       Cholelithiasis- saw Dr. Santana 3/18 after single episode of cholelithiasis. Rather than surgery, they've opted to meet again in 6 months obviously sooner with symptoms. No recent symptoms she notes. 2018 abdominal ultrasound report reviewed-simple cholelithiasis- asymptomatic follow   no postprandial symptoms     Liver Benign cyst incidentally noted on ultrasound.     Balance concerns - more of a concern to her. PT recommended/ordered last time. Presently doing well, walking mult.dogs mult. times daily. Overall improved. She uses a special collar on her younger dog, and trakx boot covers in ice.             She will continue fall prevention    Musculoskeletal chest pain-discussed at her  visit-she really does not do any upper body workouts-reviewed the anatomy, and the need to begin a upper body stretching routine several days weekly     Left hip pain/left knee crepitance- caution with walking with her new dog   She appears to be developing a bit of a valgus deformity suggestive of advanced left knee arthritis. She has no pain, so we will continue to follow. Again extreme caution walking remains a priority she understands.     Hand arthritis-advanced-she still enjoys gardening and chores around the house     Hypothyroidism-she will continue her Synthroid and check thyroid labs at least semiannually     Thrombocytopenia- stable we will follow     Seasonal affective disorder/Situational depression- spouse  in 2015; Has a great set of friends. Busy walking dogs in neighborhood. SSRI helpful-but she had to stop the Lexapro with loose bowel movements. Wellbutrin tried but this seemed to cause memory issues so she stopped it.      She called mid winter with issues with anxiety and feelings of depression-Lexapro suggested but she stopped it after a week or so.  It did not agree with her.  She is doing much better.  Enjoys springtime,  walking her dogs, and enjoys her neighbors     GYN care/Annual mammograms / family Hx breast cancer-she will continue at least until age 78 each winter per GYN- at [facility]. She now follows w/ Dr. Ventura's successor after he retired at [facility] each Jan. who orders her mammo then too.     Annual mammogram - each winter at [facility]. updated Mar '21. She will be 83 soon-we will discontinue at this point     Bone density concerns - per GYN several yrs ago OK. no family Hx osteoporosis. she's not inclined to repeat.     Bowel movements - change in frequency and calibre. Accompanying occasional LLQ pains. Recommended f/u with Gastro and request a CT colonography.     Colon cancer screening- colonoscopy normal 2007. She is over 78 so this will not need to be repeated unless she has symptoms.   She is currently experiencing bowel disruptions and discomfort. She is unwilling to do a colonoscopy. She has cancelled her last appointment. Suggested a CT colonography. She will f/u w/ Gastro at her next appt in 8/22.     Vitamin D deficiency/Bone density concerns-she refuses further evaluation or treatment for osteoporosis. She will continue the vitamin D however.     Sleeplessness / anxiety - Doing well presently w/ melatonin nightly        Cerumen care- She will continue follow-up with concerns     History of skin cancer-she follows with Dr. Rodríguez now twice a year. Appt. in Dec.     Hx bilateral cataracts w/ lenses - she'll consider an eye exam , as she has reading glasses, and it's been a few yrs     Dental care - she plans to f/u w/ concerns. Needs to get in.     TMJ joint pain w/ poor dentition- ongoing pain for approximately 2 months. 3 extractions completed. Intractable TMJ pains. She intends to f/u with an oral surgeon who is a friend. Recommended TMJ therapy exercises.     Hearing loss-she admits that her kids say that the TV is pretty loud.           Considering taking out options at [employer]     Flu shot each fall. updated  10/22     Covid series completed- w/ booster too. Additional booster shot updated 10/22.     Shingrix series completed     Follow-up in 5 months, sooner as needed

## 2023-07-25 ENCOUNTER — PATIENT OUTREACH (OUTPATIENT)
Dept: CARE COORDINATION | Facility: CLINIC | Age: 83
End: 2023-07-25
Payer: MEDICARE

## 2023-07-25 NOTE — PROGRESS NOTES
Unable to reach patient for call back after patient's follow up appointment with PCP.   KILEYM with call back number for patient to call if needed   If no voicemail available call attempts x 2 were made to contact the patient to assist with any questions or concerns patient may have.

## 2023-07-31 DIAGNOSIS — I10 PRIMARY HYPERTENSION: Primary | ICD-10-CM

## 2023-07-31 RX ORDER — LABETALOL 200 MG/1
300 TABLET, FILM COATED ORAL 2 TIMES DAILY
Qty: 270 TABLET | Refills: 3 | Status: SHIPPED | OUTPATIENT
Start: 2023-07-31 | End: 2023-09-18 | Stop reason: SDUPTHER

## 2023-08-21 DIAGNOSIS — E78.2 MIXED HYPERLIPIDEMIA: Primary | ICD-10-CM

## 2023-08-21 RX ORDER — ATORVASTATIN CALCIUM 40 MG/1
40 TABLET, FILM COATED ORAL DAILY
Qty: 90 TABLET | Refills: 3 | Status: SHIPPED | OUTPATIENT
Start: 2023-08-21 | End: 2024-08-20

## 2023-08-23 ENCOUNTER — EDUCATION (OUTPATIENT)
Dept: PRIMARY CARE | Facility: CLINIC | Age: 83
End: 2023-08-23
Payer: MEDICARE

## 2023-08-23 NOTE — PROGRESS NOTES
Aleena Bruce is a 83 y.o. female with hypertension.     PCP: Checo Rose MD     Patient purchased an Omron Series 5 blood pressure monitor from our [facility] about a month ago. She reached out to the pharmacy ~2 weeks ago stating that she was having trouble getting the cuff on to check her blood pressure at home as she felt the cuff was too large for her arm. I offered for the patient to come in to Dr. Rose's office so I could look at how she places the cuff and to troubleshoot with her so she can appropriately check her blood pressure at home.     Today I demonstrated on my arm how she would apply the cuff to the arm and use the bar on the cuff to help her tighten the cuff and also how to adjust the cuff so it is in the correct position. Patient then repeated this process twice while in the office and is confident she will be able to continue this at home.     Her blood pressure was checked twice using her Omron home monitor while in the office today:   First readin/73 mmHg  Second readin/80 mmHg     Her labetalol was recently increased to 200 mg tablets 1.5 tablets twice daily by Dr. Rose on 2023 from 1 tablet twice daily. She is also taking lisinopril 20 mg once daily. Patient reports that her blood pressures have not improved since the dose increase. She also reports she is not as active as she used to be since her hip fracture.     If any additional assistance is needed from the pharmacy team, a referral to [facility] can be placed and we will reach out to the patient to schedule her for a full pharmacy visit.     Thank you,     Emerson Capps, NikD

## 2023-08-28 ENCOUNTER — PATIENT OUTREACH (OUTPATIENT)
Dept: CARE COORDINATION | Facility: CLINIC | Age: 83
End: 2023-08-28
Payer: MEDICARE

## 2023-08-30 DIAGNOSIS — I10 HYPERTENSION, UNSPECIFIED TYPE: Primary | ICD-10-CM

## 2023-09-15 ENCOUNTER — TELEPHONE (OUTPATIENT)
Dept: PRIMARY CARE | Facility: CLINIC | Age: 83
End: 2023-09-15
Payer: MEDICARE

## 2023-09-15 NOTE — TELEPHONE ENCOUNTER
Received [employer] message from patient regarding elevated late afternoon blood pressures.  Called-not there.  Told her I would call back in the morning.  Additionally left her a [employer] message suggesting she take additional metoprolol as below    Mandy    I just tried to call you.  I am sorry that I missed you.  I will try again on Monday.    I believe you are on the labetalol, 1-1/2 pills twice daily presently    Over the weekend, you might consider taking 2 pills in the morning to help with the late day elevated blood pressure, and continue 1-1/2 pills in the evening.    Checo Rose MD  ===View-only below this line===      ----- Message -----       From:Aleena Bruce       Sent:9/14/2023  5:46 PM EDT         To:Checo Rose MD    Subject:Blood pressure    Hi Dr Rose,    Blood pressure still running low in the morning At 10:30a 135/66 pulse 60 At 5:30 p 188/87 Pulse 63.    It does this even after you increased the dose.     Please let me know if I need to change anything. Hope all is well with you Dr Rose.    Mandy

## 2023-09-18 DIAGNOSIS — I10 PRIMARY HYPERTENSION: ICD-10-CM

## 2023-09-18 RX ORDER — LABETALOL 200 MG/1
300 TABLET, FILM COATED ORAL 2 TIMES DAILY
Qty: 270 TABLET | Refills: 3 | COMMUNITY
Start: 2023-09-18 | End: 2023-10-31 | Stop reason: SDUPTHER

## 2023-09-18 NOTE — TELEPHONE ENCOUNTER
Patient apologized that she missed 's call. Patient has PT today, and is requesting a call back at home number after 4:30 PM. [phone]

## 2023-09-18 NOTE — TELEPHONE ENCOUNTER
Spoke with patient regarding her elevated blood pressure.  Earlier yesterday morning the blood pressure is back towards baseline systolic 130 twice daily on 1-1/2 tablets of the labetalol twice daily.    We discussed how her blood pressure is labile and at times goes up.  She will check her blood pressure in the morning or evening and use an extra 1/2 tablet of the labetalol if blood pressure over 140.  If this is the case, I asked her to refrain from rechecking her blood pressure anymore that day.  She will call with additional questions otherwise follow-up as scheduled

## 2023-09-22 DIAGNOSIS — I10 PRIMARY HYPERTENSION: Primary | ICD-10-CM

## 2023-09-22 RX ORDER — FUROSEMIDE 20 MG/1
TABLET ORAL
Qty: 45 TABLET | Refills: 3 | Status: SHIPPED | OUTPATIENT
Start: 2023-09-22

## 2023-09-27 ENCOUNTER — TELEMEDICINE (OUTPATIENT)
Dept: PHARMACY | Facility: HOSPITAL | Age: 83
End: 2023-09-27
Payer: MEDICARE

## 2023-09-27 DIAGNOSIS — D64.9 ANEMIA, UNSPECIFIED TYPE: Primary | ICD-10-CM

## 2023-09-27 DIAGNOSIS — I10 HYPERTENSION, UNSPECIFIED TYPE: Primary | ICD-10-CM

## 2023-09-27 DIAGNOSIS — N18.31 CHRONIC KIDNEY DISEASE, STAGE 3A (MULTI): ICD-10-CM

## 2023-09-27 ASSESSMENT — ENCOUNTER SYMPTOMS: HYPERTENSION: 1

## 2023-09-27 NOTE — ASSESSMENT & PLAN NOTE
Blood Pressure monitor comes pre-validated for at home use for 2 years  Blood pressure log/diary was not present during today's visit.   Smoker status: former smoker, quit 60 years ago  Blood Pressure: borderline controlled   CONTINUE:  Labetalol 200 mg 1.5 tablets by mouth twice daily, may take an extra tablet if systolic blood pressure is >140  Lisinopril 20 mg 1 tablet by mouth once daily   Counseling Points:  I have counseled this patient on appropriate blood pressure monitor techniques, provided a blood pressure monitor log, and have advised the patient to contact me with questions regarding their blood pressure.  Medications are properly dosed for renal and hepatic function.  Patient reported that she had stopped taking Phospha due to some nausea but asked why she would have been taking it. While at [facility] her phosphorous level was low so they had started her on it for replacement. After starting it, her level did increase. She has not had another phosphorous level checked since stopping the medication. I recommend checking an updated phosphorous level to determine if patient needs further supplementation.   I will follow-up with patient in ~1 month to check in to make sure her blood pressures are not increasing back up.

## 2023-09-27 NOTE — Clinical Note
Good afternoon Dr. Rose,   I spoke with Aleena today to go over her blood pressure readings, overall they are improving and she states that for the last few days she has not had to take the extra Labetalol dose at all! She did mention that she previously was on Phospha which she stopped on her own due to nausea. She states this was started while she was in acute rehab with [facility] and was unsure why. It looks like her phosphorous level was low while she was there so they started the supplementation and it came back up within normal range. Since she stopped it on her own a little while ago, I wanted to reach out to recommend ordering a phosphorous lab just to make sure her level hasn't decreased too low since stopping the supplementation.   Thank you!  Emerson Capps, NikD

## 2023-09-27 NOTE — PROGRESS NOTES
Hypertension/Blood Pressure Monitor Validation Initial Visit  Pharmacist Clinic: Hypertension Management    Aleena Bruce was referred to the Clinical Pharmacy Team for her blood pressure management.    Referring Provider: Checo Rose MD     Subjective     Allergies   Allergen Reactions    Aspirin Unknown     Hypertension  This is a chronic problem. The current episode started more than 1 year ago. The problem has been gradually worsening since onset. The problem is uncontrolled. There are no associated agents to hypertension. Risk factors for coronary artery disease include dyslipidemia and post-menopausal state. Past treatments include diuretics, beta blockers and ACE inhibitors. The current treatment provides moderate improvement.     Patient follows with [facility] however Dr. Rose has been managing her blood pressure medications. On 09/18/23 patient's blood pressures were still sometimes elevated so he told her to take an extra 1/2 tablet if her systolic blood pressure is above 140. She reports she has been checking her blood pressure mid-day and states that if it has been over 140 she'll take 1 tablet mid-day which has been helping lower her blood pressure.     Diet:   Eating more sweets now   Stopped coffee, drinking one cup of tea now in the morning   Eats a lot of fiber  Fresh fruit once a day (at least 2 bananas per day)  Protein at least 1 serving per day   Will eat eggs often   Dry cereal at night - tried to choose high fiber options    Sodium Intake:  salt not added to cooking  Does not cook with canned     Physical Activity:   Walks 0.5 miles per day without a cane  Has a workout room where she lives that she can use  Walks her dog     Blood Pressure Monitor Device: Omron Series 5 purchased ~2 months ago (comes pre-validated)    Affordability/Accessibility: No issues  Adherence/Organization: No issues  Did you take your antihypertensive medications this morning: Yes  Have you missed  any doses of your antihypertensives? No; Patient reports that she never misses doses of her hypertension medications  Adverse Effects: None reported     Hypertension Pharmacotherapy:  Labetalol 200 mg 1.5 tablets twice daily, if BP over 140 take an extra 1/2 tablet in the morning or evening   Lisinopril 20 mg twice daily     Historical Hypertension Pharmacotherapy:   Furosemide 20 mg (discontinued inpatient during last admission)  Lisinopril-hydrochlorothiazide (switched to lisinopril, unsure why)  Atenolol 50 mg (patient unsure why this was stopped)  Carvedilol 12.5 mg (patient unsure why this was stopped)    Medication Reconciliation  Removed:  Hydrocodone-acetaminophen 5-325 mg (therapy completed)  Oxycodone 5 mg (therapy completed     Current Outpatient Medications on File Prior to Visit   Medication Sig Dispense Refill    acetaminophen (Tylenol 8 Hour) 650 mg ER tablet Take 2 tablets (1,300 mg) by mouth 2 times a day as needed for mild pain (1 - 3) or moderate pain (4 - 6). Do not crush, chew, or split. 30 tablet 0    amiodarone (Pacerone) 200 mg tablet Take 0.5 tablets (100 mg) by mouth once daily.      ascorbic acid (Vitamin C) 500 mg tablet Take 2 tablets (1,000 mg) by mouth once daily.      atorvastatin (Lipitor) 40 mg tablet Take 1 tablet (40 mg) by mouth once daily. 90 tablet 3    cholecalciferol (Vitamin D-3) 50 mcg (2,000 unit) capsule Take by mouth.      furosemide (Lasix) 20 mg tablet TAKE ONE-HALF (1/2) TABLET BY MOUTH EVERY OTHER DAY 45 tablet 3    labetalol (Normodyne) 200 mg tablet Take 1.5 tablets (300 mg) by mouth 2 times a day. If BP over 140, take extra 0.5 tablet either in the morning, or evening 270 tablet 3    levothyroxine (Synthroid, Levoxyl) 75 mcg tablet Take 1 tablet (75 mcg) by mouth once daily in the morning. Take before meals. 90 tablet 1    lisinopril 20 mg tablet Take 1 tablet (20 mg) by mouth twice a day.      melatonin 3 mg capsule Take 1 capsule by mouth once daily at bedtime.       fluticasone (Flonase) 50 mcg/actuation nasal spray Administer 1 spray into each nostril once daily as needed for allergies.      nitroglycerin (Nitrostat) 0.3 mg SL tablet DISSOLVE 1 TABLET UNDER THE TONGUE EVERY 5 MINUTES AS NEEDED FOR CHEST PAIN      Phospha 250 Neutral tablet Take 1 tablet (250 mg) by mouth 4 times a day.      [DISCONTINUED] furosemide (Lasix) 20 mg tablet TAKE ONE-HALF (1/2) TABLET BY MOUTH EVERY OTHER DAY      [DISCONTINUED] HYDROcodone-acetaminophen (Norco) 5-325 mg tablet Take 1 tablet by mouth every 4 hours if needed for pain.      [DISCONTINUED] oxyCODONE (Roxicodone) 5 mg immediate release tablet Take 1 tablet (5 mg) by mouth every 8 hours if needed.       No current facility-administered medications on file prior to visit.      SMBP Measurements:   Patient did not have blood pressure log in front of her today  She did report that her blood pressures have been elevated prior to adding the extra tablet for systolic readings >140, since then she reports the last 3 days her blood pressures have been in the 120s-130s systolic and she has not had to take the extra labetalol     Has the patient experienced any low blood pressures since last contact? No  denies symptoms of low blood pressure: lightheadedness, dizziness, falls, blurry vision, clammy/pale skin     Has the patient experienced any high blood pressures since last contact? No  denies symptoms of high blood pressure: headache, chest pain, vision problems    Objective     RELEVANT LAB RESULTS  Lab Results   Component Value Date    BILITOT 0.6 10/01/2020    CALCIUM 9.9 04/10/2023    CO2 28 04/10/2023     04/10/2023    CREATININE 0.90 04/10/2023    GLUCOSE 98 04/10/2023    ALKPHOS 82 10/01/2020    K 4.2 04/10/2023    PROT 6.5 10/01/2020     04/10/2023    AST 34 10/01/2020    ALT 17 04/10/2023    BUN 14 04/10/2023    ANIONGAP 11 04/10/2023    MG 2.05 01/18/2018    ALBUMIN 4.2 10/01/2020    GFRF 63 04/10/2023     Lab Results  "  Component Value Date    TRIG 70 04/10/2023    CHOL 144 04/10/2023    HDL 68.0 04/10/2023     No results found for: \"HGBA1C\"  The ASCVD Risk score (Crow TURCIOS, et al., 2019) failed to calculate for the following reasons:    The 2019 ASCVD risk score is only valid for ages 40 to 79    DRUG INTERACTIONS  No significant drug-drug interactions exist that require change in therapy    Assessment/Plan   Problem List Items Addressed This Visit       Hypertension - Primary     Blood Pressure monitor comes pre-validated for at home use for 2 years  Blood pressure log/diary was not present during today's visit.   Smoker status: former smoker, quit 60 years ago  Blood Pressure: borderline controlled   CONTINUE:  Labetalol 200 mg 1.5 tablets by mouth twice daily, may take an extra tablet if systolic blood pressure is >140  Lisinopril 20 mg 1 tablet by mouth once daily   Counseling Points:  I have counseled this patient on appropriate blood pressure monitor techniques, provided a blood pressure monitor log, and have advised the patient to contact me with questions regarding their blood pressure.  Medications are properly dosed for renal and hepatic function.  Patient reported that she had stopped taking Phospha due to some nausea but asked why she would have been taking it. While at [facility] her phosphorous level was low so they had started her on it for replacement. After starting it, her level did increase. She has not had another phosphorous level checked since stopping the medication. I recommend checking an updated phosphorous level to determine if patient needs further supplementation.   I will follow-up with patient in ~1 month to check in to make sure her blood pressures are not increasing back up.          Relevant Orders    Follow Up In [facility] - Pharmacy     Follow Up: October 27, 2023    PCP Follow Up: November 7, 2023  (with Dr. Whitaker)     Emerson Capps, PharmD    Continue all meds " under the continuation of care with the referring provider and clinical pharmacy team.

## 2023-10-04 ENCOUNTER — LAB (OUTPATIENT)
Dept: LAB | Facility: LAB | Age: 83
End: 2023-10-04
Payer: MEDICARE

## 2023-10-04 DIAGNOSIS — N18.31 CHRONIC KIDNEY DISEASE, STAGE 3A (MULTI): ICD-10-CM

## 2023-10-04 DIAGNOSIS — D64.9 ANEMIA, UNSPECIFIED TYPE: ICD-10-CM

## 2023-10-04 LAB
ANION GAP SERPL CALC-SCNC: 12 MMOL/L (ref 10–20)
BUN SERPL-MCNC: 18 MG/DL (ref 6–23)
CALCIUM SERPL-MCNC: 9.6 MG/DL (ref 8.6–10.3)
CHLORIDE SERPL-SCNC: 106 MMOL/L (ref 98–107)
CO2 SERPL-SCNC: 26 MMOL/L (ref 21–32)
CREAT SERPL-MCNC: 0.82 MG/DL (ref 0.5–1.05)
ERYTHROCYTE [DISTWIDTH] IN BLOOD BY AUTOMATED COUNT: 13.9 % (ref 11.5–14.5)
FOLATE SERPL-MCNC: 18 NG/ML
GFR SERPL CREATININE-BSD FRML MDRD: 70 ML/MIN/1.73M*2
GLUCOSE SERPL-MCNC: 81 MG/DL (ref 74–99)
HCT VFR BLD AUTO: 37.8 % (ref 36–46)
HGB BLD-MCNC: 12 G/DL (ref 12–16)
HGB RETIC QN: 34 PG (ref 28–38)
IMMATURE RETIC FRACTION: 4.2 %
IRON SATN MFR SERPL: 26 % (ref 25–45)
IRON SERPL-MCNC: 82 UG/DL (ref 35–150)
MCH RBC QN AUTO: 30.8 PG (ref 26–34)
MCHC RBC AUTO-ENTMCNC: 31.7 G/DL (ref 32–36)
MCV RBC AUTO: 97 FL (ref 80–100)
NRBC BLD-RTO: 0 /100 WBCS (ref 0–0)
PHOSPHATE SERPL-MCNC: 3.8 MG/DL (ref 2.5–4.9)
PLATELET # BLD AUTO: 178 X10*3/UL (ref 150–450)
PMV BLD AUTO: 10.5 FL (ref 7.5–11.5)
POTASSIUM SERPL-SCNC: 4.4 MMOL/L (ref 3.5–5.3)
RBC # BLD AUTO: 3.89 X10*6/UL (ref 4–5.2)
RETICS #: 0.04 X10*6/UL (ref 0.02–0.11)
RETICS/RBC NFR AUTO: 1 % (ref 0.5–2)
SODIUM SERPL-SCNC: 140 MMOL/L (ref 136–145)
TIBC SERPL-MCNC: 310 UG/DL (ref 240–445)
UIBC SERPL-MCNC: 228 UG/DL (ref 110–370)
VIT B12 SERPL-MCNC: 955 PG/ML (ref 211–911)
WBC # BLD AUTO: 7.8 X10*3/UL (ref 4.4–11.3)

## 2023-10-04 PROCEDURE — 36415 COLL VENOUS BLD VENIPUNCTURE: CPT

## 2023-10-04 NOTE — RESULT ENCOUNTER NOTE
Mandy    Thanks for doing the follow-up labs.  I am glad the results frankly look fine.  The B12 level is slightly high-if you are taking extra vitamin B-12 you can cut back on this.    The phosphorus level is fine, so you do not need this.  The kidney labs look fine and there is no signs of anemia.  The iron level also looks fine.  You can stop extra iron if you are doing that at this time.    Thanks again for doing the labs.    Sincerely,  Checo Rose MD

## 2023-10-24 ENCOUNTER — APPOINTMENT (OUTPATIENT)
Dept: PRIMARY CARE | Facility: CLINIC | Age: 83
End: 2023-10-24
Payer: MEDICARE

## 2023-10-27 ENCOUNTER — TELEMEDICINE (OUTPATIENT)
Dept: PHARMACY | Facility: HOSPITAL | Age: 83
End: 2023-10-27
Payer: MEDICARE

## 2023-10-27 DIAGNOSIS — I10 HYPERTENSION, UNSPECIFIED TYPE: ICD-10-CM

## 2023-10-27 ASSESSMENT — ENCOUNTER SYMPTOMS: HYPERTENSION: 1

## 2023-10-27 NOTE — ASSESSMENT & PLAN NOTE
Blood Pressure monitor comes pre-validated for at home use for 2 years  Blood pressure log/diary was not present during today's visit.   Smoker status: former smoker, quit 60 years ago  Blood Pressure: controlled   CONTINUE:  Labetalol 200 mg 1.5 tablets by mouth twice daily  Lisinopril 20 mg 1 tablet by mouth once daily   Patient's blood pressure has done very well over the past month. She reports that she is only taking Labetalol 200 mg 1.5 tablets twice daily daily and is no longer taking an extra 1/2 tablet if her systolic blood pressure is   Counseling Points:  I have counseled this patient on appropriate blood pressure monitor techniques, provided a blood pressure monitor log, and have advised the patient to contact me with questions regarding their blood pressure.  Medications are properly dosed for renal and hepatic function.  Patient was instructed to reach out to Dr. Rose's office if she experiences more dizziness that may be related to lower blood pressure readings if her blood pressure continues to decrease. She verbalized this understanding. If the patient needs further follow-up from the pharmacy team in the future for hypertension management please have patient reach out to myself.

## 2023-10-27 NOTE — PROGRESS NOTES
Hypertension/Blood Pressure Monitor Validation Initial Visit  Pharmacist Clinic: Hypertension Management    Aleena Bruce was referred to the Clinical Pharmacy Team for her blood pressure management.    Referring Provider: Checo Rose MD     Subjective     Allergies   Allergen Reactions    Aspirin Unknown     Hypertension  This is a chronic problem. The current episode started more than 1 year ago. The problem has been gradually improving since onset. The problem is controlled. There are no associated agents to hypertension. Risk factors for coronary artery disease include dyslipidemia and post-menopausal state. Past treatments include diuretics, beta blockers and ACE inhibitors. The current treatment provides moderate improvement.     Since our last pharmacy follow-up patient reports doing much better with her blood pressures. She has recovered well from her broken femur and states that she walks 0.5 miles in the morning and 1 mile later in the day to stay active. She also reports that she may have had some anxiety around that time too with her injury that may have been contributing to her elevated blood pressure.     She also notes she is taking Metamucil once daily to help with constipation and states that this has been very helpful for her. She also mentioned that Dr. Rose started her back on furosemide 20 mg 1/2 tablet by mouth every other day.     Diet:   Overall eating healthy  Cut back on caffeine     Sodium Intake:  salt not added to cooking  Does not cook with canned foods    Physical Activity:   Walks 0.5 miles in the morning and ~1 mile in the evening   Has a workout room where she lives that she can use  Walks her dog     Blood Pressure Monitor Device: Omron Series 5 purchased ~2 months ago (comes pre-validated)    Affordability/Accessibility: No issues  Adherence/Organization: No issues  Adverse Effects: None reported     Hypertension Pharmacotherapy:  Labetalol 200 mg 1.5 tablets twice  daily  Lisinopril 20 mg twice daily     Historical Hypertension Pharmacotherapy:   Lisinopril-hydrochlorothiazide (switched to lisinopril, unsure why)  Atenolol 50 mg (patient unsure why this was stopped)  Carvedilol 12.5 mg (patient unsure why this was stopped)    Medication Reconciliation  Added: Psyllium powder 1 dose once daily   Removed: Phospha 250 nuetral (patient had stopped taking on her own and her phosphorus levels have been within normal limits since she stopped)    Current Outpatient Medications on File Prior to Visit   Medication Sig Dispense Refill    psyllium (Metamucil) powder Take 1 Dose (5.8 g) by mouth once daily.      acetaminophen (Tylenol 8 Hour) 650 mg ER tablet Take 2 tablets (1,300 mg) by mouth 2 times a day as needed for mild pain (1 - 3) or moderate pain (4 - 6). Do not crush, chew, or split. 30 tablet 0    amiodarone (Pacerone) 200 mg tablet Take 0.5 tablets (100 mg) by mouth once daily.      ascorbic acid (Vitamin C) 500 mg tablet Take 2 tablets (1,000 mg) by mouth once daily.      atorvastatin (Lipitor) 40 mg tablet Take 1 tablet (40 mg) by mouth once daily. 90 tablet 3    cholecalciferol (Vitamin D-3) 50 mcg (2,000 unit) capsule Take by mouth.      fluticasone (Flonase) 50 mcg/actuation nasal spray Administer 1 spray into each nostril once daily as needed for allergies.      furosemide (Lasix) 20 mg tablet TAKE ONE-HALF (1/2) TABLET BY MOUTH EVERY OTHER DAY 45 tablet 3    labetalol (Normodyne) 200 mg tablet Take 1.5 tablets (300 mg) by mouth 2 times a day. If BP over 140, take extra 0.5 tablet either in the morning, or evening 270 tablet 3    levothyroxine (Synthroid, Levoxyl) 75 mcg tablet Take 1 tablet (75 mcg) by mouth once daily in the morning. Take before meals. 90 tablet 1    lisinopril 20 mg tablet Take 1 tablet (20 mg) by mouth twice a day.      melatonin 3 mg capsule Take 1 capsule by mouth once daily at bedtime.      nitroglycerin (Nitrostat) 0.3 mg SL tablet DISSOLVE 1  "TABLET UNDER THE TONGUE EVERY 5 MINUTES AS NEEDED FOR CHEST PAIN      [DISCONTINUED] Phospha 250 Neutral tablet Take 1 tablet (250 mg) by mouth 4 times a day.       No current facility-administered medications on file prior to visit.      SMBP Measurements:   Patient did not have her log in front of her today however reports that her blood pressures are in the 120s-130s/65s over the past month     Has the patient experienced any low blood pressures since last contact? Yes  Patient does report dizziness rarely but states she is not concerned about it at this time as her blood pressures are doing very well.     Has the patient experienced any high blood pressures since last contact? No  denies symptoms of high blood pressure: headache, chest pain, vision problems    Objective     RELEVANT LAB RESULTS  Lab Results   Component Value Date    BILITOT 0.6 10/01/2020    CALCIUM 9.6 10/04/2023    CO2 26 10/04/2023     10/04/2023    CREATININE 0.82 10/04/2023    GLUCOSE 81 10/04/2023    ALKPHOS 82 10/01/2020    K 4.4 10/04/2023    PROT 6.5 10/01/2020     10/04/2023    AST 34 10/01/2020    ALT 17 04/10/2023    BUN 18 10/04/2023    ANIONGAP 12 10/04/2023    MG 2.05 01/18/2018    PHOS 3.8 10/04/2023    ALBUMIN 4.2 10/01/2020    GFRF 63 04/10/2023     Lab Results   Component Value Date    TRIG 70 04/10/2023    CHOL 144 04/10/2023    HDL 68.0 04/10/2023     No results found for: \"HGBA1C\"  The ASCVD Risk score (Crow DK, et al., 2019) failed to calculate for the following reasons:    The 2019 ASCVD risk score is only valid for ages 40 to 79    DRUG INTERACTIONS  No significant drug-drug interactions exist that require change in therapy    Assessment/Plan   Problem List Items Addressed This Visit       Hypertension     Blood Pressure monitor comes pre-validated for at home use for 2 years  Blood pressure log/diary was not present during today's visit.   Smoker status: former smoker, quit 60 years ago  Blood Pressure: " controlled   CONTINUE:  Labetalol 200 mg 1.5 tablets by mouth twice daily  Lisinopril 20 mg 1 tablet by mouth once daily   Patient's blood pressure has done very well over the past month. She reports that she is only taking Labetalol 200 mg 1.5 tablets twice daily daily and is no longer taking an extra 1/2 tablet if her systolic blood pressure is   Counseling Points:  I have counseled this patient on appropriate blood pressure monitor techniques, provided a blood pressure monitor log, and have advised the patient to contact me with questions regarding their blood pressure.  Medications are properly dosed for renal and hepatic function.  I will follow-up with patient in ~1 month to check in to make sure her blood pressures are not increasing back up.           Follow Up: As needed per patient or provider request   PCP Follow Up: November 7, 2023  (with Dr. Whitaker)     Emerson Capps, PharmD  P: [phone]     Continue all meds under the continuation of care with the referring provider and clinical pharmacy team.

## 2023-10-30 DIAGNOSIS — I10 PRIMARY HYPERTENSION: ICD-10-CM

## 2023-11-01 RX ORDER — LABETALOL 200 MG/1
300 TABLET, FILM COATED ORAL 2 TIMES DAILY
Qty: 270 TABLET | Refills: 3 | Status: SHIPPED | OUTPATIENT
Start: 2023-11-01

## 2023-11-07 ENCOUNTER — OFFICE VISIT (OUTPATIENT)
Dept: PRIMARY CARE | Facility: CLINIC | Age: 83
End: 2023-11-07
Payer: MEDICARE

## 2023-11-07 VITALS
HEART RATE: 54 BPM | SYSTOLIC BLOOD PRESSURE: 104 MMHG | WEIGHT: 117 LBS | BODY MASS INDEX: 20.73 KG/M2 | HEIGHT: 63 IN | RESPIRATION RATE: 16 BRPM | DIASTOLIC BLOOD PRESSURE: 54 MMHG | OXYGEN SATURATION: 98 %

## 2023-11-07 DIAGNOSIS — Z87.81 HISTORY OF FRACTURE OF LEFT HIP: ICD-10-CM

## 2023-11-07 DIAGNOSIS — N18.31 CHRONIC KIDNEY DISEASE, STAGE 3A (MULTI): ICD-10-CM

## 2023-11-07 DIAGNOSIS — M25.552 PAIN OF LEFT HIP JOINT: ICD-10-CM

## 2023-11-07 DIAGNOSIS — G47.00 INSOMNIA, UNSPECIFIED TYPE: ICD-10-CM

## 2023-11-07 DIAGNOSIS — C44.90 SKIN CANCER: ICD-10-CM

## 2023-11-07 DIAGNOSIS — Z13.89 SCREENING FOR MULTIPLE CONDITIONS: ICD-10-CM

## 2023-11-07 DIAGNOSIS — R07.9 CHEST PAIN SYNDROME: ICD-10-CM

## 2023-11-07 DIAGNOSIS — R55 VASOVAGAL SYNCOPE: ICD-10-CM

## 2023-11-07 DIAGNOSIS — E78.5 HYPERLIPIDEMIA, UNSPECIFIED HYPERLIPIDEMIA TYPE: ICD-10-CM

## 2023-11-07 DIAGNOSIS — S72.002A HIP FRACTURE, LEFT, CLOSED, INITIAL ENCOUNTER (MULTI): ICD-10-CM

## 2023-11-07 DIAGNOSIS — E03.9 HYPOTHYROIDISM, UNSPECIFIED TYPE: Primary | ICD-10-CM

## 2023-11-07 DIAGNOSIS — K80.20 CALCULUS OF GALLBLADDER WITHOUT CHOLECYSTITIS WITHOUT OBSTRUCTION: ICD-10-CM

## 2023-11-07 DIAGNOSIS — E55.9 VITAMIN D DEFICIENCY: ICD-10-CM

## 2023-11-07 DIAGNOSIS — I34.0 MILD MITRAL REGURGITATION BY PRIOR ECHOCARDIOGRAM: ICD-10-CM

## 2023-11-07 DIAGNOSIS — I49.5 BRADY-TACHY SYNDROME (MULTI): ICD-10-CM

## 2023-11-07 DIAGNOSIS — Z00.00 HEALTHCARE MAINTENANCE: ICD-10-CM

## 2023-11-07 DIAGNOSIS — H90.3 ASYMMETRIC SNHL (SENSORINEURAL HEARING LOSS): ICD-10-CM

## 2023-11-07 DIAGNOSIS — M15.9 OSTEOARTHRITIS OF MULTIPLE JOINTS, UNSPECIFIED OSTEOARTHRITIS TYPE: ICD-10-CM

## 2023-11-07 DIAGNOSIS — K21.9 GASTROESOPHAGEAL REFLUX DISEASE, UNSPECIFIED WHETHER ESOPHAGITIS PRESENT: ICD-10-CM

## 2023-11-07 DIAGNOSIS — I10 PRIMARY HYPERTENSION: ICD-10-CM

## 2023-11-07 PROCEDURE — 1159F MED LIST DOCD IN RCRD: CPT | Performed by: STUDENT IN AN ORGANIZED HEALTH CARE EDUCATION/TRAINING PROGRAM

## 2023-11-07 PROCEDURE — 1170F FXNL STATUS ASSESSED: CPT | Performed by: STUDENT IN AN ORGANIZED HEALTH CARE EDUCATION/TRAINING PROGRAM

## 2023-11-07 PROCEDURE — G0439 PPPS, SUBSEQ VISIT: HCPCS | Performed by: STUDENT IN AN ORGANIZED HEALTH CARE EDUCATION/TRAINING PROGRAM

## 2023-11-07 PROCEDURE — 99397 PER PM REEVAL EST PAT 65+ YR: CPT | Performed by: STUDENT IN AN ORGANIZED HEALTH CARE EDUCATION/TRAINING PROGRAM

## 2023-11-07 PROCEDURE — 3074F SYST BP LT 130 MM HG: CPT | Performed by: STUDENT IN AN ORGANIZED HEALTH CARE EDUCATION/TRAINING PROGRAM

## 2023-11-07 PROCEDURE — 1036F TOBACCO NON-USER: CPT | Performed by: STUDENT IN AN ORGANIZED HEALTH CARE EDUCATION/TRAINING PROGRAM

## 2023-11-07 PROCEDURE — 1126F AMNT PAIN NOTED NONE PRSNT: CPT | Performed by: STUDENT IN AN ORGANIZED HEALTH CARE EDUCATION/TRAINING PROGRAM

## 2023-11-07 PROCEDURE — 3078F DIAST BP <80 MM HG: CPT | Performed by: STUDENT IN AN ORGANIZED HEALTH CARE EDUCATION/TRAINING PROGRAM

## 2023-11-07 PROCEDURE — 1160F RVW MEDS BY RX/DR IN RCRD: CPT | Performed by: STUDENT IN AN ORGANIZED HEALTH CARE EDUCATION/TRAINING PROGRAM

## 2023-11-07 ASSESSMENT — ACTIVITIES OF DAILY LIVING (ADL)
GROCERY_SHOPPING: INDEPENDENT
BATHING: INDEPENDENT
DOING_HOUSEWORK: INDEPENDENT
DRESSING: INDEPENDENT
TAKING_MEDICATION: INDEPENDENT
MANAGING_FINANCES: INDEPENDENT

## 2023-11-07 ASSESSMENT — ENCOUNTER SYMPTOMS
DYSURIA: 0
MYALGIAS: 0
DIARRHEA: 0
SHORTNESS OF BREATH: 0
VOMITING: 0
CHILLS: 0
DEPRESSION: 0
LOSS OF SENSATION IN FEET: 0
FEVER: 0
OCCASIONAL FEELINGS OF UNSTEADINESS: 0
HEADACHES: 0
DYSPHORIC MOOD: 0
DIZZINESS: 0
NAUSEA: 0
DIAPHORESIS: 0
LIGHT-HEADEDNESS: 1

## 2023-11-07 NOTE — PROGRESS NOTES
"Subjective   Reason for Visit: Aleena Bruce is an 83 y.o. female here for a Medicare Wellness visit.     Past Medical, Surgical, and Family History reviewed and updated in chart.    Reviewed all medications by prescribing practitioner or clinical pharmacist (such as prescriptions, OTCs, herbal therapies and supplements) and documented in the medical record.    Here for MWV. Ecg done through [facility]     UTD on flu shot and RSV     Somewhat lightheaded when waking up in morning. She still drinks plenty of fluids. Takes BP meds once she wakes up in the AM.           Patient Care Team:  Checo Rose MD as PCP - General  Checo Rose MD as PCP - [facility] Attributed Provider     Review of Systems   Constitutional:  Negative for chills, diaphoresis and fever.   HENT:  Positive for hearing loss.    Eyes:  Negative for visual disturbance.   Respiratory:  Negative for shortness of breath.    Cardiovascular:  Negative for chest pain.   Gastrointestinal:  Negative for diarrhea, nausea and vomiting.   Endocrine: Negative for cold intolerance and heat intolerance.   Genitourinary:  Negative for dysuria.   Musculoskeletal:  Negative for myalgias.   Skin:  Negative for rash.   Neurological:  Positive for light-headedness. Negative for dizziness, syncope and headaches.   Psychiatric/Behavioral:  Negative for dysphoric mood.        Objective   Vitals:  /54   Pulse 54   Resp 16   Ht 1.6 m (5' 3\")   Wt 53.1 kg (117 lb)   SpO2 98%   BMI 20.73 kg/m²       Physical Exam  Vitals reviewed.   HENT:      Head: Normocephalic.      Right Ear: Tympanic membrane, ear canal and external ear normal. There is no impacted cerumen.      Left Ear: Tympanic membrane, ear canal and external ear normal. There is no impacted cerumen.      Mouth/Throat:      Mouth: Mucous membranes are moist.      Pharynx: Oropharynx is clear. No oropharyngeal exudate or posterior oropharyngeal erythema.   Cardiovascular:      Rate and Rhythm: Normal rate and " regular rhythm.   Pulmonary:      Effort: Pulmonary effort is normal. No respiratory distress.      Breath sounds: Normal breath sounds.   Abdominal:      General: Bowel sounds are normal. There is no distension.      Palpations: Abdomen is soft. There is no mass.      Tenderness: There is no abdominal tenderness. There is no guarding or rebound.   Musculoskeletal:         General: No swelling or deformity.      Cervical back: Neck supple. No tenderness.   Lymphadenopathy:      Cervical: No cervical adenopathy.   Skin:     Coloration: Skin is not jaundiced.   Neurological:      Mental Status: She is alert.   Psychiatric:         Mood and Affect: Mood normal.         Behavior: Behavior normal.         Assessment/Plan   Problem List Items Addressed This Visit       Acid reflux    Hair-tachy syndrome (CMS/[facility])    Chest pain syndrome    Cholelithiasis    Chronic kidney disease, stage 3a (CMS/HCC)    Hyperlipidemia    Hypothyroidism - Primary    Relevant Orders    TSH with reflex to Free T4 if abnormal    Mild mitral regurgitation by prior echocardiogram    Pain of left hip joint    Skin cancer    Sleeplessness    Vitamin D deficiency    Hip fracture, left, closed, initial encounter (CMS/[facility])    Overview     Last Assessment & Plan: Formatting of this note might be different from the original. -Orthopedic surgery consulted -S/p perc screw fixation L femur 6/26 -Weight bearing: FFWB LLE x 6 weeks. ROMAT hip -DVT ppx: Lovenox BID, continue at DC for one month due to ASA allergy. -PT/OT - SNF -MM pain control         Osteoarthritis    Asymmetric SNHL (sensorineural hearing loss)    Vasovagal syncope    Overview     Last Assessment & Plan: Formatting of this note might be different from the original. -Tele -Admission EKG showing IVCD -HS LASHAWN 18 --> 14 -ECHO ordered (last ECHO from 2019) -Carotid US WNL -Cardiology consulted given hx recurrent syncope and PVC cardiomyopathy syndrome, pending recs         Hypertension     History of fracture of left hip    Screening for multiple conditions [Z13.89]    Healthcare maintenance   Syncopal episode-while prolonged standing in [employer], with her hands elevated-she will review with her cardiologist Dr. Clark at her appointment next week. She saw Dr. Clark, not orthostatic at that office visit and will see for regularly scheduled follow up.      Left hip fracture-following fall in [employer] 6/26/2023.  Uncomplicated postop course so far.  She will follow-up with orthopedics as planned.     Rehab-she was admitted to rehab 6/28/2023, discharged 7/7/2023, 13 days ago.  She is using a walker.  She will continue her PT at [facility]-now finished.     Labile hypertension-she will continue labetalol 1 and half pills twice daily and review with Dr. Clark next week.  She will get a new home blood pressure machine and check her blood pressure midday and call if consistently over 135 systolic. Connected with [facility] pharmacy clinic for BP.  She will try to take BP meds 60 to 90 minutes later in morning so she can eat and drink more fluids and hopefully cause less symptoms. Let us know if SBP <100.       Decreased left ear hearing-she saw Dr. 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  At this point she states that hearing aid will not help amplify this and so limited options-will see next week.       Living situation-she lives with her 2 grown children, her son and daughter, who coincidentally have both lost their spouses. She enjoys walking her dogs daily. She remains independent continues to drive. She typically takes a 2-hour nap after lunch.  Still has 1 dog            She walks several dogs for her neighbors     Chest pain syndrome s/p ER evaluation 7/26/22 - Recent chest pains. Visited the ER. All tests were negative. She is scheduled to do a coronary angiogram.            Negative cardiac eval in 2022.            will see her cardiologist in 11/23     Hypertension- labile/dyslipidemia- Improved  upon recheck. Functionally doing very well without dizziness or chest pain. Stable of late. no orthostasis. will check labs regularly. Saw [facility] pharmacy team- BP now well controlled with labetalol 200mg tablets, 1.5 tabs BID, lisinopril 20mg/day     Fitness routine - she walks her dog, 2-3 walks daily, about 25 min in the am, and then 2 shorter walks. She walks year round. She continues walking several dogs around the neighborhood daily.      Tachybradycardia syndrome/valvular heart disease-she will continue annual visits and cardiology with Dr. Segundo Clark. She remains asymptomatic, without any dizziness or syncopal episodes which were occurring after urination. Echo and labs followed she notes.    No recent cardiac symptoms walking routine walking dogs quite good daily. Stable symptoms. she'll follow with him each December. Unremarkable CT coronary artery angiogram and nuclear stress test 10/22     Mild chronic kidney disease-stage IIIA- we will continue to follow. Stable     Nutritional concerns-weight is down several pounds this spring but she is avoiding salt and overall eating better she states. Weight stable today, minimally decreased.     Acid reflux / dyspepsia - Improved. Off meds. Not active issue.   Occasionally problematic w/ accompanying dyspepsia. Using Pepcid AC for relief- perhaps once weekly and using metamucil PRN teaspoon.      Cholelithiasis- saw Dr. Santana 3/18 after single episode of cholelithiasis. Rather than surgery, they've opted to meet again in 6 months obviously sooner with symptoms. No recent symptoms she notes. April 2018 abdominal ultrasound report reviewed-simple cholelithiasis- asymptomatic follow   no postprandial symptoms-will let us know.      Liver Benign cyst incidentally noted on ultrasound.     Balance concerns - more of a concern to her. PT recommended/ordered last time. Presently doing well, walking mult.dogs mult. times daily. Overall improved. She uses a special  collar on her younger dog, and trakx boot covers in ice.             She will continue fall prevention     Musculoskeletal chest pain-discussed at her  visit-she really does not do any upper body workouts-reviewed the anatomy, and the need to begin a upper body stretching routine several days weekly     Left hip pain/left knee crepitance- caution with walking with her new dog   She appears to be developing a bit of a valgus deformity suggestive of advanced left knee arthritis. She has no pain, so we will continue to follow. Again extreme caution walking remains a priority she understands. S/p percutaneous screw fixation left femoral neck fracture, seeing Ortho at [facility].      Hand arthritis-advanced-she still enjoys gardening and chores around the house, no pain but  is decreased.      Hypothyroidism-she will continue her Synthroid and check thyroid labs at least semiannually. Ordered TSH.      Thrombocytopenia- stable we will follow     Seasonal affective disorder/Situational depression- spouse  in 2015; Has a great set of friends. Busy walking dogs in neighborhood. SSRI helpful-but she had to stop the Lexapro with loose bowel movements. Wellbutrin tried but this seemed to cause memory issues so she stopped it.      She called mid winter with issues with anxiety and feelings of depression-Lexapro suggested but she stopped it after a week or so.  It did not agree with her.  She is doing much better.  Enjoys springtime, walking her dogs, and enjoys her neighbors     GYN care/Annual mammograms / family Hx breast cancer-she will continue at least until age 78 each winter per GYN- at [facility]. She now follows w/ Dr. Ventura's successor after he retired at [facility] each . who orders her mammo then too, done .      Annual mammogram - each winter at [facility]. updated Mar '21. She will be 83 soon-we will discontinue at this point     Bone density concerns - per GYN several yrs ago OK. no family Hx  osteoporosis. she's not inclined to repeat.      Bowel movements - change in frequency and calibre. Accompanying occasional LLQ pains. Recommended f/u with Gastro and request a CT colonography-done. Symptoms better and regular BM now, no straining and stools are solid.      Colon cancer screening- colonoscopy normal 2007. She is over 78 so this will not need to be repeated unless she has symptoms.   She is currently experiencing bowel disruptions and discomfort. She is unwilling to do a colonoscopy. She has cancelled her last appointment. Suggested a CT colonography. She will f/u w/ Gastro at her next appt in 8/22. Had CT colonography. Hasn't gone back to GI and she doesn't feel a need to go/not interested in follow up.      Vitamin D deficiency/Bone density concerns-she refuses further evaluation or treatment for osteoporosis. She will continue the vitamin D however.     Sleeplessness / anxiety - Doing well presently w/ melatonin nightly     Cerumen care- She will continue follow-up with concerns     History of skin cancer-she follows with Dr. Rodríguez now reportedly annually.      Hx bilateral cataracts w/ lenses - she'll consider an eye exam , as she has reading glasses, reports last saw 18 months ago, advised f/u.      Dental care - she plans to f/u w/ concerns. Needs to get in.     TMJ joint pain w/ poor dentition- ongoing pain for approximately 2 months. 3 extractions completed. Intractable TMJ pains. She intends to f/u with an oral surgeon who is a friend. Recommended TMJ therapy exercises.     Hearing loss-she admits that her kids say that the TV is pretty loud. Will see ENT.           Considering taking out options at [employer]     Flu shot each fall. updated 2023 and RSV done in 2023.      Covid series completed- w/ booster too. Additional booster shot updated 10/22.      Shingrix series completed     Follow-up in 5-6 months with Dr. Rose, sooner as needed

## 2023-11-09 ENCOUNTER — TELEPHONE (OUTPATIENT)
Dept: PRIMARY CARE | Facility: CLINIC | Age: 83
End: 2023-11-09

## 2023-11-09 ENCOUNTER — LAB (OUTPATIENT)
Dept: LAB | Facility: LAB | Age: 83
End: 2023-11-09
Payer: MEDICARE

## 2023-11-09 DIAGNOSIS — E03.9 HYPOTHYROIDISM, UNSPECIFIED TYPE: ICD-10-CM

## 2023-11-09 DIAGNOSIS — E03.8 OTHER SPECIFIED HYPOTHYROIDISM: ICD-10-CM

## 2023-11-09 LAB — TSH SERPL-ACNC: 2.79 MIU/L (ref 0.44–3.98)

## 2023-11-09 PROCEDURE — 36415 COLL VENOUS BLD VENIPUNCTURE: CPT

## 2023-11-09 PROCEDURE — 84443 ASSAY THYROID STIM HORMONE: CPT

## 2023-11-09 RX ORDER — LEVOTHYROXINE SODIUM 75 UG/1
75 TABLET ORAL
Qty: 90 TABLET | Refills: 1 | Status: SHIPPED | OUTPATIENT
Start: 2023-11-09

## 2023-11-09 NOTE — TELEPHONE ENCOUNTER
Dr Whitaker had order TSH blood work - pt received results and would like call back to discuss - please use home phone

## 2023-11-16 ENCOUNTER — OFFICE VISIT (OUTPATIENT)
Dept: OTOLARYNGOLOGY | Facility: CLINIC | Age: 83
End: 2023-11-16
Payer: MEDICARE

## 2023-11-16 ENCOUNTER — CLINICAL SUPPORT (OUTPATIENT)
Dept: AUDIOLOGY | Facility: CLINIC | Age: 83
End: 2023-11-16
Payer: MEDICARE

## 2023-11-16 VITALS
TEMPERATURE: 97.8 F | HEIGHT: 63 IN | BODY MASS INDEX: 20.73 KG/M2 | WEIGHT: 117 LBS | SYSTOLIC BLOOD PRESSURE: 150 MMHG | DIASTOLIC BLOOD PRESSURE: 73 MMHG

## 2023-11-16 DIAGNOSIS — H93.12 TINNITUS OF LEFT EAR: ICD-10-CM

## 2023-11-16 DIAGNOSIS — H90.3 ASYMMETRICAL SENSORINEURAL HEARING LOSS: Primary | ICD-10-CM

## 2023-11-16 DIAGNOSIS — H90.3 ASYMMETRIC SNHL (SENSORINEURAL HEARING LOSS): Primary | ICD-10-CM

## 2023-11-16 PROCEDURE — 1036F TOBACCO NON-USER: CPT | Performed by: OTOLARYNGOLOGY

## 2023-11-16 PROCEDURE — 99213 OFFICE O/P EST LOW 20 MIN: CPT | Performed by: OTOLARYNGOLOGY

## 2023-11-16 PROCEDURE — 1159F MED LIST DOCD IN RCRD: CPT | Performed by: OTOLARYNGOLOGY

## 2023-11-16 PROCEDURE — 92557 COMPREHENSIVE HEARING TEST: CPT | Performed by: AUDIOLOGIST

## 2023-11-16 PROCEDURE — 92550 TYMPANOMETRY & REFLEX THRESH: CPT | Performed by: AUDIOLOGIST

## 2023-11-16 PROCEDURE — 1160F RVW MEDS BY RX/DR IN RCRD: CPT | Performed by: OTOLARYNGOLOGY

## 2023-11-16 PROCEDURE — 1126F AMNT PAIN NOTED NONE PRSNT: CPT | Performed by: OTOLARYNGOLOGY

## 2023-11-16 PROCEDURE — 3077F SYST BP >= 140 MM HG: CPT | Performed by: OTOLARYNGOLOGY

## 2023-11-16 PROCEDURE — 3078F DIAST BP <80 MM HG: CPT | Performed by: OTOLARYNGOLOGY

## 2023-11-16 ASSESSMENT — PAIN SCALES - GENERAL: PAINLEVEL_OUTOF10: 0 - NO PAIN

## 2023-11-16 ASSESSMENT — ENCOUNTER SYMPTOMS: OCCASIONAL FEELINGS OF UNSTEADINESS: 0

## 2023-11-16 ASSESSMENT — PAIN - FUNCTIONAL ASSESSMENT: PAIN_FUNCTIONAL_ASSESSMENT: 0-10

## 2023-11-16 NOTE — PROGRESS NOTES
AUDIOLOGY ADULT AUDIOMETRIC EVALUATION      Name:  Aleena Bruce  :  1940  Age:  83 y.o.  Date of Evaluation: 23    History:  Reason for visit:  Ms. Aleena Bruce was seen today as part of the visit with Christopher Avila D.O. for an evaluation of hearing.   Chief Complaint   Patient presents with    Hearing Loss    Follow-up      The patient was seen for a follow up test due to a previous sudden sensorineural hearing loss in the left ear. Stated she has not noticed any significant changes in hearing in either ear. Admitted she believes she has been coping with the hearing loss well and has become used to hearing just from the right sided. Stated at times she may have to put more effort into hearing especially in noise.  Mentioned she may noticed some slight pressure in the left ear at times, however, denied any otalgia.   Reported this past  she experienced an episode of fainting and she hit the left side of her head and experienced a concussion and broke her left femur. Mentioned she may experience some sensations of feeling lightheaded, however, believes it is more due to her blood pressure.  Previous hearing testing performed 5/15/23 indicated a mild to moderate high frequency sensorineural hearing loss of the right ear and a severe to profound sensorineural hearing loss in the left ear.  Denied any current dizziness/vertigo, ear surgery, recent ear/sinus infections, sinus/throat concerns, ear drainage, or sudden hearing loss.  Note previous case history from 5/15/23:  The patient stated approximately six months ago she experienced a bad tooth infection on the upper left side of her mouth, and had two teeth pulled. Around that time, she noticed the hearing in the left ear appeared to be dropping suddenly.   Stated she has noticed within the past two months, more of a sudden hearing loss in the left ear. Stated when she is laying on her right side she is unable to hear well from the left  ear.  Mentioned for the past five months she has also noticed a constant left sided buzzing tinnitus.   She has no concerns for the right ear and feels she is hearing well.   She denied any otalgia, aural fullness, dizziness/vertigo, recent ear/sinus infections, significant noise exposure, recent head trauma or recent heart attack or strokes.     EVALUATION      See Audiogram    RESULTS:    Otoscopic Evaluation:   Right Ear: Otoscopy for the right ear revealed a clear healthy canal and a healthy tympanic membrane was visualized.   Left Ear: Otoscopy for the left ear revealed a clear healthy canal and a healthy tympanic membrane was visualized.     Immittance:  Immittance Measures: 226 Hz   Right Ear: Tympanometric testing revealed a normal type A tympanogram with normal middle ear pressure and normal static compliance.  Left Ear: Tympanometric testing revealed a type As tympanogram with normal middle ear pressure and reduced static compliance.    Right Ear: Ipsilateral acoustic reflexes were present at, 500-4,000 Hz, at expected sensation levels.  Left Ear: Ipsilateral acoustic reflexes were absent at, 500-4,000 Hz. Results are consistent with the test results.     Test technique:  Pure Tone Audiometry via insert earphones    Reliability:   excellent    Pure Tone Audiometry:    Right Ear: Audiometric testing of the right ear using insert earphones indicated normal peripheral hearing sensitivity through 1,000 Hz, sloping to a mild to moderate sensorineural hearing loss above.   Left Ear:   Audiometric testing of the left ear using insert earphones indicated severe sensorineural hearing loss through 4,000 Hz, sloping to a severe to profound sensorineural hearing loss above.      Speech Audiometry:   Right Ear:  Speech Reception Threshold (SRT) was obtained at 25 dBHL                 Word Recognition scores were excellent (100%) in quiet when words were presented at 65 dBHL  Left Ear:  Speech Reception Threshold (SRT)  was obtained at 75 dBHL                 Word Recognition scores were very poor (12%) in quiet when words were presented at 95 dBHL  Testing was performed with recorded NU-6 speech words in quiet. Speech thresholds were in good agreement with the pure tone averages in each ear.     IMPRESSIONS:  Today's test results are hearing loss requiring medical/otologic and audiologic follow-up.  The patient was counseled with regard to the findings.  When compared to the previous hearing test of 5/15/23 there has been essentially no change in hearing sensitivity in either ear.   Amplification needs:  BiCros hearing aids or a right hearing aid was recommended due to the hearing loss and the patient's concerns for hearing difficulties.     RECOMMENDATIONS:  * Continue medical follow up with Christopher Avila D.O.  * Retest as medically indicated, or sooner if a change in hearing sensitivity or tinnitus is noticed.   * Wear hearing protection while in the presence of loud sounds.   * Use tinnitus coping strategies as needed, such as sound apps on a smart phone, utilizing calming noise in the room, running a fan at night, etc.   * Pending medical management and patient desire, consider returning for a hearing aid evaluation to discuss amplification options and communication needs. The patient was instructed to call their insurance to check for any hearing aid benefits and to determine if [facility] was in network for hearing aids.   * Use effective communication strategies such as asking the speaker to gain attention prior to speaking, speaking in the same room, repeating words that were heard, etc.  * Consider a cochlear implant evaluation with the [place] office.    PATIENT EDUCATION:   Discussed results and recommendations with the patient and a copy of the hearing test was provided.  Questions were addressed and the patient was encouraged to contact our department should concerns arise.  Discussed speech intelligibility,  cognitive load and listening effort. The patient was counseled although there are still significant hearing abilities, the sound sounds of speech are not coming in as easily as they once did. Counseled to consider hearing aids, to help reduce the amount of listening effort required by the brain.  The patient was seen from  8:00-8:30 am.

## 2023-11-16 NOTE — PROGRESS NOTES
Impression:  1. Asymmetrical sensorineural hearing loss              RECOMMENDATIONS/PLAN :  I reassured the patient that her hearing has not worsened over the last 6 months however it has not improved.  Her hearing in her right ear is stable and she seems to be doing fairly well however she cannot hear at all in her left ear.  I would like to refer her to one of my otology colleagues to discuss the possibility of a Cros/ Bicros hearing aid for her left ear.  At this point she is not interested in an MRI of her IACs.  She will call if she has any other concerns and can otherwise follow-up as needed.      **This electronic medical record note was created with the use of voice recognition software.  Despite proofreading, typographical or grammatical errors may be present that could affect meaning of content **    Subjective   Patient ID:     Aleena Bruce is a 83 y.o. female who presents to the office today as a recheck on her left ear.  About 6 months ago she had developed a rather sudden onset of hearing loss in her left ear and she did not seek any treatment for about 4 weeks.  Unfortunately we did not give her any oral steroids and at the time she did not want to pursue an MRI.  She seems to be doing fairly well and she denies any significant pressure or fullness in the ears or any vertigo/spinning or any other neurologic complaints.    ROS:  A detailed 12 system review of systems is noted on the intake form has been reviewed with the patient with details noted in the HPI and scanned into the patient's medical record.    Objective     Past Medical History:   Diagnosis Date    Adjustment disorder with depressed mood 04/27/2017    Situational depression    Bitten or stung by nonvenomous insect and other nonvenomous arthropods, initial encounter 06/10/2017    Tick bite, initial encounter    Cardiomegaly 01/12/2016    Left ventricular hypertrophy    Encounter for general adult medical examination without abnormal  findings 11/12/2021    Encounter for Medicare annual wellness exam    Other general symptoms and signs 12/19/2016    Flu-like symptoms    Personal history of other diseases of the musculoskeletal system and connective tissue 12/02/2014    History of arthritis    Personal history of other specified conditions 01/12/2016    History of syncope        Past Surgical History:   Procedure Laterality Date    CATARACT EXTRACTION  04/28/2014    Cataract Surgery    COLONOSCOPY  04/17/2013    Complete Colonoscopy    HYSTERECTOMY  04/28/2014    Hysterectomy    TONSILLECTOMY  04/28/2014    Tonsillectomy With Adenoidectomy        Allergies   Allergen Reactions    Aspirin Unknown          Current Outpatient Medications:     acetaminophen (Tylenol 8 Hour) 650 mg ER tablet, Take 2 tablets (1,300 mg) by mouth 2 times a day as needed for mild pain (1 - 3) or moderate pain (4 - 6). Do not crush, chew, or split., Disp: 30 tablet, Rfl: 0    amiodarone (Pacerone) 200 mg tablet, Take 0.5 tablets (100 mg) by mouth once daily., Disp: , Rfl:     ascorbic acid (Vitamin C) 500 mg tablet, Take 2 tablets (1,000 mg) by mouth once daily., Disp: , Rfl:     atorvastatin (Lipitor) 40 mg tablet, Take 1 tablet (40 mg) by mouth once daily., Disp: 90 tablet, Rfl: 3    cholecalciferol (Vitamin D-3) 50 mcg (2,000 unit) capsule, Take by mouth., Disp: , Rfl:     furosemide (Lasix) 20 mg tablet, TAKE ONE-HALF (1/2) TABLET BY MOUTH EVERY OTHER DAY, Disp: 45 tablet, Rfl: 3    labetalol (Normodyne) 200 mg tablet, Take 1.5 tablets (300 mg) by mouth 2 times a day. If BP over 140, take extra 0.5 tablet either in the morning, or evening, Disp: 270 tablet, Rfl: 3    levothyroxine (Synthroid, Levoxyl) 75 mcg tablet, Take 1 tablet (75 mcg) by mouth once daily in the morning. Take before meals., Disp: 90 tablet, Rfl: 1    lisinopril 20 mg tablet, Take 1 tablet (20 mg) by mouth twice a day., Disp: , Rfl:     melatonin 3 mg capsule, Take 1 capsule by mouth once daily at  "bedtime., Disp: , Rfl:     psyllium (Metamucil) powder, Take 1 Dose (5.8 g) by mouth once daily., Disp: , Rfl:     fluticasone (Flonase) 50 mcg/actuation nasal spray, Administer 1 spray into each nostril once daily as needed for allergies., Disp: , Rfl:     nitroglycerin (Nitrostat) 0.3 mg SL tablet, DISSOLVE 1 TABLET UNDER THE TONGUE EVERY 5 MINUTES AS NEEDED FOR CHEST PAIN, Disp: , Rfl:      Tobacco Use: Medium Risk (11/16/2023)    Patient History     Smoking Tobacco Use: Former     Smokeless Tobacco Use: Never     Passive Exposure: Not on file        Alcohol Use: Not on file        Social History     Substance and Sexual Activity   Drug Use Defer        Physical Exam:  Visit Vitals  /73   Temp 36.6 °C (97.8 °F) (Temporal)   Ht 1.6 m (5' 3\")   Wt 53.1 kg (117 lb)   BMI 20.73 kg/m²   Smoking Status Former   BSA 1.54 m²   Right ear-external canal is patent.  Tympanic membrane intact with good mobility.  No effusion.  Mastoid nontender.  Left ear-external canal is patent.  Tympanic membrane intact with good mobility.  No effusion.  Mastoid nontender.  Nose-clear no rhinorrhea  Oral cavity-clear no lesions    Results:   I reviewed her recent audiogram and she does have a mild high-frequency sensorineural loss in the right ear and an asymmetric severe sensorineural loss in the left ear.  This is unchanged from her previous audiogram from May 2023.  Both tympanic membranes are moving fairly well on tympanometry.  Word recognition score is 100% in the right ear and 12% in the left ear.  Speech reception threshold is 25 dB in the right ear and 75 dB in the left ear.    Procedure:   []    Christopher Avila, DO   "

## 2023-12-13 DIAGNOSIS — I10 PRIMARY HYPERTENSION: Primary | ICD-10-CM

## 2023-12-13 RX ORDER — LISINOPRIL 20 MG/1
10 TABLET ORAL 2 TIMES DAILY
COMMUNITY
Start: 2023-12-13

## 2024-04-19 ENCOUNTER — TELEPHONE (OUTPATIENT)
Dept: PRIMARY CARE | Facility: CLINIC | Age: 84
End: 2024-04-19
Payer: MEDICARE

## 2024-04-20 DIAGNOSIS — R11.0 NAUSEA: Primary | ICD-10-CM

## 2024-04-20 RX ORDER — PROMETHAZINE HYDROCHLORIDE 12.5 MG/1
TABLET ORAL
Qty: 30 TABLET | Refills: 2 | Status: SHIPPED | OUTPATIENT
Start: 2024-04-20 | End: 2024-05-20 | Stop reason: WASHOUT

## 2024-05-13 DIAGNOSIS — I10 PRIMARY HYPERTENSION: ICD-10-CM

## 2024-05-13 DIAGNOSIS — R55 VASOVAGAL SYNCOPE: ICD-10-CM

## 2024-05-13 DIAGNOSIS — E55.9 VITAMIN D DEFICIENCY: Primary | ICD-10-CM

## 2024-05-13 DIAGNOSIS — E78.5 HYPERLIPIDEMIA, UNSPECIFIED HYPERLIPIDEMIA TYPE: ICD-10-CM

## 2024-05-13 DIAGNOSIS — E03.9 ACQUIRED HYPOTHYROIDISM: ICD-10-CM

## 2024-05-14 ENCOUNTER — LAB (OUTPATIENT)
Dept: LAB | Facility: LAB | Age: 84
End: 2024-05-14
Payer: MEDICARE

## 2024-05-14 DIAGNOSIS — E03.9 ACQUIRED HYPOTHYROIDISM: ICD-10-CM

## 2024-05-14 DIAGNOSIS — R55 VASOVAGAL SYNCOPE: ICD-10-CM

## 2024-05-14 DIAGNOSIS — G47.33 OBSTRUCTIVE SLEEP APNEA ON CPAP: Primary | ICD-10-CM

## 2024-05-14 DIAGNOSIS — E78.5 HYPERLIPIDEMIA, UNSPECIFIED HYPERLIPIDEMIA TYPE: ICD-10-CM

## 2024-05-14 DIAGNOSIS — E55.9 VITAMIN D DEFICIENCY: ICD-10-CM

## 2024-05-14 LAB
25(OH)D3 SERPL-MCNC: 37 NG/ML (ref 30–100)
ALBUMIN SERPL BCP-MCNC: 4.4 G/DL (ref 3.4–5)
ALP SERPL-CCNC: 60 U/L (ref 33–136)
ALT SERPL W P-5'-P-CCNC: 16 U/L (ref 7–45)
ANION GAP SERPL CALC-SCNC: 12 MMOL/L (ref 10–20)
AST SERPL W P-5'-P-CCNC: 24 U/L (ref 9–39)
BILIRUB SERPL-MCNC: 0.6 MG/DL (ref 0–1.2)
BUN SERPL-MCNC: 10 MG/DL (ref 6–23)
CALCIUM SERPL-MCNC: 9.8 MG/DL (ref 8.6–10.3)
CHLORIDE SERPL-SCNC: 103 MMOL/L (ref 98–107)
CO2 SERPL-SCNC: 28 MMOL/L (ref 21–32)
CREAT SERPL-MCNC: 0.83 MG/DL (ref 0.5–1.05)
EGFRCR SERPLBLD CKD-EPI 2021: 69 ML/MIN/1.73M*2
ERYTHROCYTE [DISTWIDTH] IN BLOOD BY AUTOMATED COUNT: 14.3 % (ref 11.5–14.5)
GLUCOSE SERPL-MCNC: 100 MG/DL (ref 74–99)
HCT VFR BLD AUTO: 36.5 % (ref 36–46)
HGB BLD-MCNC: 11.8 G/DL (ref 12–16)
MCH RBC QN AUTO: 30.9 PG (ref 26–34)
MCHC RBC AUTO-ENTMCNC: 32.3 G/DL (ref 32–36)
MCV RBC AUTO: 96 FL (ref 80–100)
NRBC BLD-RTO: 0 /100 WBCS (ref 0–0)
PLATELET # BLD AUTO: 167 X10*3/UL (ref 150–450)
POTASSIUM SERPL-SCNC: 4.8 MMOL/L (ref 3.5–5.3)
PROT SERPL-MCNC: 6.4 G/DL (ref 6.4–8.2)
RBC # BLD AUTO: 3.82 X10*6/UL (ref 4–5.2)
SODIUM SERPL-SCNC: 138 MMOL/L (ref 136–145)
TSH SERPL-ACNC: 2.65 MIU/L (ref 0.44–3.98)
WBC # BLD AUTO: 5.2 X10*3/UL (ref 4.4–11.3)

## 2024-05-14 PROCEDURE — 80053 COMPREHEN METABOLIC PANEL: CPT

## 2024-05-14 PROCEDURE — 82306 VITAMIN D 25 HYDROXY: CPT

## 2024-05-14 PROCEDURE — 84443 ASSAY THYROID STIM HORMONE: CPT

## 2024-05-14 PROCEDURE — 85027 COMPLETE CBC AUTOMATED: CPT

## 2024-05-14 PROCEDURE — 36415 COLL VENOUS BLD VENIPUNCTURE: CPT

## 2024-05-15 NOTE — RESULT ENCOUNTER NOTE
Mandy-thanks for doing the labs.  I am glad the results look stable without any worrisome findings.    Looking forward to seeing you soon as scheduled.    Sincerely,  Checo Rose MD

## 2024-05-20 ENCOUNTER — OFFICE VISIT (OUTPATIENT)
Dept: PRIMARY CARE | Facility: CLINIC | Age: 84
End: 2024-05-20
Payer: MEDICARE

## 2024-05-20 VITALS
OXYGEN SATURATION: 96 % | DIASTOLIC BLOOD PRESSURE: 72 MMHG | HEART RATE: 68 BPM | BODY MASS INDEX: 20.2 KG/M2 | SYSTOLIC BLOOD PRESSURE: 128 MMHG | HEIGHT: 63 IN | WEIGHT: 114 LBS | TEMPERATURE: 97.4 F

## 2024-05-20 DIAGNOSIS — N18.31 CHRONIC KIDNEY DISEASE, STAGE 3A (MULTI): ICD-10-CM

## 2024-05-20 DIAGNOSIS — D69.6 THROMBOCYTOPENIA (CMS-HCC): ICD-10-CM

## 2024-05-20 DIAGNOSIS — R10.13 DYSPEPSIA: Primary | ICD-10-CM

## 2024-05-20 DIAGNOSIS — I49.5 BRADY-TACHY SYNDROME (MULTI): ICD-10-CM

## 2024-05-20 PROCEDURE — 1159F MED LIST DOCD IN RCRD: CPT | Performed by: INTERNAL MEDICINE

## 2024-05-20 PROCEDURE — 3078F DIAST BP <80 MM HG: CPT | Performed by: INTERNAL MEDICINE

## 2024-05-20 PROCEDURE — 1036F TOBACCO NON-USER: CPT | Performed by: INTERNAL MEDICINE

## 2024-05-20 PROCEDURE — 1160F RVW MEDS BY RX/DR IN RCRD: CPT | Performed by: INTERNAL MEDICINE

## 2024-05-20 PROCEDURE — 99214 OFFICE O/P EST MOD 30 MIN: CPT | Performed by: INTERNAL MEDICINE

## 2024-05-20 PROCEDURE — 1123F ACP DISCUSS/DSCN MKR DOCD: CPT | Performed by: INTERNAL MEDICINE

## 2024-05-20 PROCEDURE — G2211 COMPLEX E/M VISIT ADD ON: HCPCS | Performed by: INTERNAL MEDICINE

## 2024-05-20 PROCEDURE — 3074F SYST BP LT 130 MM HG: CPT | Performed by: INTERNAL MEDICINE

## 2024-05-20 RX ORDER — ONDANSETRON 4 MG/1
4 TABLET, ORALLY DISINTEGRATING ORAL EVERY 8 HOURS PRN
Qty: 20 TABLET | Refills: 2 | Status: SHIPPED | OUTPATIENT
Start: 2024-05-20 | End: 2024-06-09

## 2024-05-20 ASSESSMENT — ENCOUNTER SYMPTOMS
LOSS OF SENSATION IN FEET: 0
DEPRESSION: 0
OCCASIONAL FEELINGS OF UNSTEADINESS: 0

## 2024-05-20 NOTE — PROGRESS NOTES
"Subjective   Patient ID: Aleena Bruce is a 84 y.o. female who presents for Follow-up.    Here for follow-up  Her early April respiratory COVID symptoms have resolved but she had diarrhea  That is improved somewhat.  No abdominal pain  She states that she did have some black bowel movements several weeks ago.  No rectal bleeding no recent recurrence         Review of Systems    Objective   /72   Pulse 68   Temp 36.3 °C (97.4 °F)   Ht 1.6 m (5' 3\")   Wt 51.7 kg (114 lb)   SpO2 96%   BMI 20.19 kg/m²     Physical Exam  Constitutional:       Appearance: Normal appearance.   HENT:      Head: Normocephalic and atraumatic.      Nose: Nose normal.   Eyes:      General: No scleral icterus.     Extraocular Movements: Extraocular movements intact.      Conjunctiva/sclera: Conjunctivae normal.      Pupils: Pupils are equal, round, and reactive to light.   Cardiovascular:      Rate and Rhythm: Normal rate and regular rhythm.      Pulses: Normal pulses.      Heart sounds: Normal heart sounds. No murmur heard.  Pulmonary:      Effort: Pulmonary effort is normal. No respiratory distress.      Breath sounds: Normal breath sounds. No stridor. No wheezing.   Abdominal:      General: Abdomen is flat. Bowel sounds are normal. There is no distension.      Palpations: Abdomen is soft. There is no mass.      Tenderness: There is no abdominal tenderness. There is no guarding.   Musculoskeletal:         General: No swelling, tenderness or deformity. Normal range of motion.      Cervical back: Normal range of motion and neck supple. No tenderness.   Lymphadenopathy:      Cervical: No cervical adenopathy.   Skin:     General: Skin is warm and dry.      Findings: No lesion or rash.   Neurological:      General: No focal deficit present.      Mental Status: She is alert and oriented to person, place, and time.      Cranial Nerves: No cranial nerve deficit.      Motor: No weakness.   Psychiatric:         Mood and Affect: Mood normal. "         Behavior: Behavior normal.         Thought Content: Thought content normal.         Judgment: Judgment normal.         Assessment/Plan   Problem List Items Addressed This Visit             ICD-10-CM    Hair-tachy syndrome (Multi) I49.5    Chronic kidney disease, stage 3a (Multi) N18.31    Thrombocytopenia (CMS-[facility]) D69.6     Other Visit Diagnoses         Codes    Dyspepsia    -  Primary R10.13    Relevant Medications    ondansetron ODT (Zofran-ODT) 4 mg disintegrating tablet            Lab on 05/14/2024   Component Date Value Ref Range Status    WBC 05/14/2024 5.2  4.4 - 11.3 x10*3/uL Final    nRBC 05/14/2024 0.0  0.0 - 0.0 /100 WBCs Final    RBC 05/14/2024 3.82 (L)  4.00 - 5.20 x10*6/uL Final    Hemoglobin 05/14/2024 11.8 (L)  12.0 - 16.0 g/dL Final    Hematocrit 05/14/2024 36.5  36.0 - 46.0 % Final    MCV 05/14/2024 96  80 - 100 fL Final    MCH 05/14/2024 30.9  26.0 - 34.0 pg Final    MCHC 05/14/2024 32.3  32.0 - 36.0 g/dL Final    RDW 05/14/2024 14.3  11.5 - 14.5 % Final    Platelets 05/14/2024 167  150 - 450 x10*3/uL Final    Glucose 05/14/2024 100 (H)  74 - 99 mg/dL Final    Sodium 05/14/2024 138  136 - 145 mmol/L Final    Potassium 05/14/2024 4.8  3.5 - 5.3 mmol/L Final    Chloride 05/14/2024 103  98 - 107 mmol/L Final    Bicarbonate 05/14/2024 28  21 - 32 mmol/L Final    Anion Gap 05/14/2024 12  10 - 20 mmol/L Final    Urea Nitrogen 05/14/2024 10  6 - 23 mg/dL Final    Creatinine 05/14/2024 0.83  0.50 - 1.05 mg/dL Final    eGFR 05/14/2024 69  >60 mL/min/1.73m*2 Final    Calculations of estimated GFR are performed using the 2021 CKD-EPI Study Refit equation without the race variable for the IDMS-Traceable creatinine methods.  https://jasn.asnjournals.org/content/early/2021/09/22/ASN.8124444135    Calcium 05/14/2024 9.8  8.6 - 10.3 mg/dL Final    Albumin 05/14/2024 4.4  3.4 - 5.0 g/dL Final    Alkaline Phosphatase 05/14/2024 60  33 - 136 U/L Final    Total Protein 05/14/2024 6.4  6.4 - 8.2 g/dL Final     AST 05/14/2024 24  9 - 39 U/L Final    Bilirubin, Total 05/14/2024 0.6  0.0 - 1.2 mg/dL Final    ALT 05/14/2024 16  7 - 45 U/L Final    Patients treated with Sulfasalazine may generate falsely decreased results for ALT.    Thyroid Stimulating Hormone 05/14/2024 2.65  0.44 - 3.98 mIU/L Final    Vitamin D, 25-Hydroxy, Total 05/14/2024 37  30 - 100 ng/mL Final         Portions of this encounter note have been copied from my previous note dated  7/20/23 , which have been updated where appropriate and all reflect my current medical decision making from today.        Living situation-she lives  in her house- one story living. Her  2 grown children, her son and daughter, who coincidentally have both lost their spouses- live there. She enjoys walking her dogs daily. She remains independent continues to drive. She typically takes a 2-hour nap after lunch.  Still has 1 dog.            She walks several dogs for her neighbors    FLMA-form completed for her daughter 5/20/2024 to help with visits and transportation as well as ER and emergency care    S/p covid illness 4/23-resolved from a respiratory standpoint    Post covid gastroenteritis-she has had nausea and diarrhea-fortunately improved    Black bowel movements-occurred after her COVID illness-.  She understands if this recurs she will go to the ER understanding this could be indicating internal bleeding.  She will notify her gastroenterologist about this concern as well.    Hx colitis - worse after 4/24 covid illness. Gi eval scheduled for 7/24     Hx Syncopal episodes-while prolonged standing in [place], with her hands elevated-she will review with her cardiologist Dr. Clark            She saw Dr. Clark in 12/23. No further eval needed. Next appt 12/24    Chest pain syndrome s/p ER evaluation 7/26/22 - Recent chest pains. Visited the ER. All tests were negative. She is scheduled to do a coronary angiogram.            Negative cardiac eval in 2022.            She saw  Dr. Addison in 12/23; she was told not to follow up unless problems developed     Hypertension- labile/dyslipidemia- Improved upon recheck. Functionally doing very well without dizziness or chest pain. Stable of late. no orthostasis. will check labs regularly     Left hip fracture-following fall in [place] 6/26/2023.  Uncomplicated postop course so far.  She will follow-up with orthopedics as planned        Decreased left ear hearing-she saw Dr. 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  At this point she states that hearing aid will help amplify this and so limited options           Fitness routine - she walks her dog, 2-3 walks daily, about 25 min in the am, and then 2 shorter walks. She walks year round.S he continues walking several dogs around the neighborhood daily.      Tachybradycardia syndrome/valvular heart disease-she will continue annual visits and cardiology with Dr. Segundo Clark. She remains asymptomatic, without any dizziness or syncopal episodes which were occurring after urination. Echo and labs followed she notes.    No recent cardiac symptoms walking routine walking dogs quite good daily. Stable symptoms. she'll follow with him each December. Unremarkable CT coronary artery angiogram and nuclear stress test 10/22        Mild chronic kidney disease-stage IIIA- we will continue to follow. Stable     Nutritional concerns-weight is down several pounds this spring but she is avoiding salt and overall eating better she states     Acid reflux / dyspepsia - Improved. Off meds. Not active issue.   Occasionally problematic w/ accompanying dyspepsia. Using Pepcid AC for relief- perhaps once weekly        Cholelithiasis- saw Dr. Santana 3/18 after single episode of cholelithiasis. Rather than surgery, they've opted to meet again in 6 months obviously sooner with symptoms. No recent symptoms she notes. April 2018 abdominal ultrasound report reviewed-simple cholelithiasis- asymptomatic follow   no  postprandial symptoms     Liver Benign cyst incidentally noted on ultrasound.     Balance concerns - more of a concern to her. PT recommended/ordered last time. Presently doing well, walking mult.dogs mult. times daily. Overall improved. She uses a special collar on her younger dog, and trakx boot covers in ice.             She will continue fall prevention     Musculoskeletal chest pain-discussed at her  visit-she really does not do any upper body workouts-reviewed the anatomy, and the need to begin a upper body stretching routine several days weekly     Left hip pain/left knee crepitance- caution with walking with her new dog   She appears to be developing a bit of a valgus deformity suggestive of advanced left knee arthritis. She has no pain, so we will continue to follow. Again extreme caution walking remains a priority she understands.     Hand arthritis-advanced-she still enjoys gardening and chores around the house     Hypothyroidism-she will continue her Synthroid and check thyroid labs at least semiannually     Thrombocytopenia- stable we will follow     Seasonal affective disorder/Situational depression- spouse  in 2015; Has a great set of friends. Busy walking dogs in neighborhood. SSRI helpful-but she had to stop the Lexapro with loose bowel movements. Wellbutrin tried but this seemed to cause memory issues so she stopped it.      She called mid winter with issues with anxiety and feelings of depression-Lexapro suggested but she stopped it after a week or so.  It did not agree with her.  She is doing much better.  Enjoys springtime, walking her dogs, and enjoys her neighbors     GYN care/Annual mammograms / family Hx breast cancer-she will continue at least until age 78 each winter per GYN- at [facility]. She now follows w/ Dr. Ventura's successor after he retired at [facility] each . who orders her mammo then too.     Annual mammogram - each winter at [facility]. updated Mar '21. She will be 83  soon-we will discontinue at this point     Bone density concerns - per GYN several yrs ago OK. no family Hx osteoporosis. she's not inclined to repeat.          Colon cancer screening- colonoscopy normal 2007. She is over 78 so this will not need to be repeated unless she has symptoms.   She is currently experiencing bowel disruptions and discomfort. She is unwilling to do a colonoscopy. She has cancelled her last appointment. Suggested a CT colonography. She will f/u w/ Gastro at her next appt in 8/22.     Vitamin D deficiency/Bone density concerns-she refuses further evaluation or treatment for osteoporosis. She will continue the vitamin D however.     Sleeplessness / anxiety - Doing well presently w/ melatonin nightly    Nonrestorative sleep-sleep study discussed/ordered to help with daytime somnolence.          5/24-at this point she does not want to proceed with that        Cerumen care- She will continue follow-up with concerns     History of skin cancer-she follows with Dr. Rodríguez now twice a year. Appt. in Dec.     Hx bilateral cataracts w/ lenses - she'll consider an eye exam , as she has reading glasses, and it's been a few yrs     Dental care - she plans to f/u w/ concerns. Needs to get in.     TMJ joint pain w/ poor dentition- ongoing pain for approximately 2 months. 3 extractions completed. Intractable TMJ pains. She intends to f/u with an oral surgeon who is a friend. Recommended TMJ therapy exercises.     Hearing loss-she admits that her kids say that the TV is pretty loud.           Considering taking out options at [employer]     Flu shot each fall. updated 10/22     Covid series completed- w/ booster too. Additional booster shot updated 10/22.     Shingrix series completed     Follow-up in 5-6  months, sooner as needed

## 2024-06-23 DIAGNOSIS — I10 PRIMARY HYPERTENSION: ICD-10-CM

## 2024-06-24 RX ORDER — LISINOPRIL 20 MG/1
20 TABLET ORAL EVERY 12 HOURS
Qty: 180 TABLET | Refills: 0 | Status: SHIPPED | OUTPATIENT
Start: 2024-06-24

## 2024-07-15 DIAGNOSIS — E03.8 OTHER SPECIFIED HYPOTHYROIDISM: ICD-10-CM

## 2024-07-15 RX ORDER — LEVOTHYROXINE SODIUM 75 UG/1
75 TABLET ORAL
Qty: 90 TABLET | Refills: 1 | Status: SHIPPED | OUTPATIENT
Start: 2024-07-15

## 2024-07-25 ENCOUNTER — APPOINTMENT (OUTPATIENT)
Dept: CARDIOLOGY | Facility: CLINIC | Age: 84
End: 2024-07-25
Payer: MEDICARE

## 2024-08-12 ENCOUNTER — APPOINTMENT (OUTPATIENT)
Dept: OTOLARYNGOLOGY | Facility: CLINIC | Age: 84
End: 2024-08-12
Payer: MEDICARE

## 2024-09-04 DIAGNOSIS — E78.2 MIXED HYPERLIPIDEMIA: ICD-10-CM

## 2024-09-05 RX ORDER — ATORVASTATIN CALCIUM 40 MG/1
40 TABLET, FILM COATED ORAL DAILY
Qty: 90 TABLET | Refills: 3 | Status: SHIPPED | OUTPATIENT
Start: 2024-09-05

## 2024-10-10 DIAGNOSIS — I10 PRIMARY HYPERTENSION: ICD-10-CM

## 2024-10-10 RX ORDER — LISINOPRIL 20 MG/1
20 TABLET ORAL EVERY 12 HOURS
Qty: 180 TABLET | Refills: 3 | Status: SHIPPED | OUTPATIENT
Start: 2024-10-10

## 2024-10-10 RX ORDER — LABETALOL 200 MG/1
200 TABLET, FILM COATED ORAL 2 TIMES DAILY
Qty: 270 TABLET | Refills: 3 | Status: SHIPPED | OUTPATIENT
Start: 2024-10-10

## 2024-10-15 ENCOUNTER — TELEPHONE (OUTPATIENT)
Dept: PRIMARY CARE | Facility: CLINIC | Age: 84
End: 2024-10-15
Payer: MEDICARE

## 2024-10-15 DIAGNOSIS — I10 PRIMARY HYPERTENSION: ICD-10-CM

## 2024-10-15 RX ORDER — LABETALOL 200 MG/1
TABLET, FILM COATED ORAL
Qty: 270 TABLET | Refills: 3 | Status: SHIPPED | OUTPATIENT
Start: 2024-10-15

## 2024-10-15 NOTE — TELEPHONE ENCOUNTER
Pt needs script for labetalol 200mg - resent to [facility] - pt stated instructions should read 1.5 twice daily - please resend or advise

## 2024-10-29 DIAGNOSIS — I10 PRIMARY HYPERTENSION: ICD-10-CM

## 2024-10-30 RX ORDER — FUROSEMIDE 20 MG/1
TABLET ORAL
Qty: 45 TABLET | Refills: 3 | Status: SHIPPED | OUTPATIENT
Start: 2024-10-30

## 2024-11-04 DIAGNOSIS — E03.9 HYPOTHYROIDISM, UNSPECIFIED TYPE: ICD-10-CM

## 2024-11-04 DIAGNOSIS — E55.9 VITAMIN D DEFICIENCY: Primary | ICD-10-CM

## 2024-11-04 DIAGNOSIS — D64.9 MILD ANEMIA: ICD-10-CM

## 2024-11-04 DIAGNOSIS — N18.31 CHRONIC KIDNEY DISEASE, STAGE 3A (MULTI): ICD-10-CM

## 2024-11-04 DIAGNOSIS — D69.6 THROMBOCYTOPENIA (CMS-HCC): ICD-10-CM

## 2024-11-06 ENCOUNTER — LAB (OUTPATIENT)
Dept: LAB | Facility: LAB | Age: 84
End: 2024-11-06
Payer: MEDICARE

## 2024-11-06 DIAGNOSIS — E55.9 VITAMIN D DEFICIENCY: ICD-10-CM

## 2024-11-06 DIAGNOSIS — E03.9 HYPOTHYROIDISM, UNSPECIFIED TYPE: ICD-10-CM

## 2024-11-06 DIAGNOSIS — D64.9 MILD ANEMIA: ICD-10-CM

## 2024-11-06 DIAGNOSIS — N18.31 CHRONIC KIDNEY DISEASE, STAGE 3A (MULTI): ICD-10-CM

## 2024-11-06 LAB
25(OH)D3 SERPL-MCNC: 40 NG/ML (ref 30–100)
ANION GAP SERPL CALC-SCNC: 11 MMOL/L (ref 10–20)
BUN SERPL-MCNC: 21 MG/DL (ref 6–23)
CALCIUM SERPL-MCNC: 9.8 MG/DL (ref 8.6–10.3)
CHLORIDE SERPL-SCNC: 104 MMOL/L (ref 98–107)
CO2 SERPL-SCNC: 30 MMOL/L (ref 21–32)
CREAT SERPL-MCNC: 1.08 MG/DL (ref 0.5–1.05)
EGFRCR SERPLBLD CKD-EPI 2021: 50 ML/MIN/1.73M*2
ERYTHROCYTE [DISTWIDTH] IN BLOOD BY AUTOMATED COUNT: 13.5 % (ref 11.5–14.5)
FOLATE SERPL-MCNC: 16.9 NG/ML
GLUCOSE SERPL-MCNC: 102 MG/DL (ref 74–99)
HCT VFR BLD AUTO: 38.3 % (ref 36–46)
HGB BLD-MCNC: 12.5 G/DL (ref 12–16)
IRON SATN MFR SERPL: 28 % (ref 25–45)
IRON SERPL-MCNC: 94 UG/DL (ref 35–150)
MAGNESIUM SERPL-MCNC: 1.96 MG/DL (ref 1.6–2.4)
MCH RBC QN AUTO: 31.6 PG (ref 26–34)
MCHC RBC AUTO-ENTMCNC: 32.6 G/DL (ref 32–36)
MCV RBC AUTO: 97 FL (ref 80–100)
NRBC BLD-RTO: 0 /100 WBCS (ref 0–0)
PLATELET # BLD AUTO: 158 X10*3/UL (ref 150–450)
POTASSIUM SERPL-SCNC: 4.5 MMOL/L (ref 3.5–5.3)
RBC # BLD AUTO: 3.96 X10*6/UL (ref 4–5.2)
SODIUM SERPL-SCNC: 140 MMOL/L (ref 136–145)
TIBC SERPL-MCNC: 338 UG/DL (ref 240–445)
TSH SERPL-ACNC: 3.93 MIU/L (ref 0.44–3.98)
UIBC SERPL-MCNC: 244 UG/DL (ref 110–370)
VIT B12 SERPL-MCNC: 358 PG/ML (ref 211–911)
WBC # BLD AUTO: 5.5 X10*3/UL (ref 4.4–11.3)

## 2024-11-06 PROCEDURE — 85027 COMPLETE CBC AUTOMATED: CPT

## 2024-11-06 PROCEDURE — 82306 VITAMIN D 25 HYDROXY: CPT

## 2024-11-06 PROCEDURE — 84443 ASSAY THYROID STIM HORMONE: CPT

## 2024-11-06 PROCEDURE — 80048 BASIC METABOLIC PNL TOTAL CA: CPT

## 2024-11-06 PROCEDURE — 83550 IRON BINDING TEST: CPT

## 2024-11-06 PROCEDURE — 83540 ASSAY OF IRON: CPT

## 2024-11-06 PROCEDURE — 82607 VITAMIN B-12: CPT

## 2024-11-06 PROCEDURE — 83735 ASSAY OF MAGNESIUM: CPT

## 2024-11-06 PROCEDURE — 82746 ASSAY OF FOLIC ACID SERUM: CPT

## 2024-11-06 PROCEDURE — 36415 COLL VENOUS BLD VENIPUNCTURE: CPT

## 2024-11-06 NOTE — RESULT ENCOUNTER NOTE
Results reviewed. No urgent findings.  Will Review results in detail at upcoming office appointment scheduled soon.      Checo Rose MD

## 2024-11-11 ENCOUNTER — APPOINTMENT (OUTPATIENT)
Dept: PRIMARY CARE | Facility: CLINIC | Age: 84
End: 2024-11-11
Payer: MEDICARE

## 2024-11-11 DIAGNOSIS — M15.9 OSTEOARTHRITIS OF MULTIPLE JOINTS, UNSPECIFIED OSTEOARTHRITIS TYPE: ICD-10-CM

## 2024-11-11 DIAGNOSIS — I34.0 MILD MITRAL REGURGITATION BY PRIOR ECHOCARDIOGRAM: ICD-10-CM

## 2024-11-11 DIAGNOSIS — I42.9 CARDIOMYOPATHY, UNSPECIFIED TYPE (MULTI): ICD-10-CM

## 2024-11-11 DIAGNOSIS — I10 PRIMARY HYPERTENSION: ICD-10-CM

## 2024-11-11 DIAGNOSIS — K21.9 GASTROESOPHAGEAL REFLUX DISEASE, UNSPECIFIED WHETHER ESOPHAGITIS PRESENT: ICD-10-CM

## 2024-11-11 DIAGNOSIS — R00.2 PALPITATIONS: ICD-10-CM

## 2024-11-11 DIAGNOSIS — N18.31 CHRONIC KIDNEY DISEASE, STAGE 3A (MULTI): ICD-10-CM

## 2024-11-11 DIAGNOSIS — I49.5 BRADY-TACHY SYNDROME (MULTI): Primary | ICD-10-CM

## 2024-11-11 PROBLEM — Z97.3 WEARS GLASSES: Status: ACTIVE | Noted: 2024-11-11

## 2024-11-11 PROBLEM — D69.6 THROMBOCYTOPENIA (CMS-HCC): Status: RESOLVED | Noted: 2023-04-18 | Resolved: 2024-11-11

## 2024-11-11 PROBLEM — Z97.4 WEARS HEARING AID: Status: ACTIVE | Noted: 2024-11-11

## 2024-11-11 PROBLEM — J06.9 ACUTE UPPER RESPIRATORY INFECTION: Status: RESOLVED | Noted: 2023-04-18 | Resolved: 2024-11-11

## 2024-11-11 PROBLEM — H90.A32 MIXED CONDUCTIVE AND SENSORINEURAL HEARING LOSS OF LEFT EAR WITH RESTRICTED HEARING OF RIGHT EAR: Status: ACTIVE | Noted: 2024-11-11

## 2024-11-11 PROCEDURE — 1170F FXNL STATUS ASSESSED: CPT | Performed by: INTERNAL MEDICINE

## 2024-11-11 PROCEDURE — G0439 PPPS, SUBSEQ VISIT: HCPCS | Performed by: INTERNAL MEDICINE

## 2024-11-11 PROCEDURE — 1159F MED LIST DOCD IN RCRD: CPT | Performed by: INTERNAL MEDICINE

## 2024-11-11 PROCEDURE — 3074F SYST BP LT 130 MM HG: CPT | Performed by: INTERNAL MEDICINE

## 2024-11-11 PROCEDURE — 3078F DIAST BP <80 MM HG: CPT | Performed by: INTERNAL MEDICINE

## 2024-11-11 PROCEDURE — 1123F ACP DISCUSS/DSCN MKR DOCD: CPT | Performed by: INTERNAL MEDICINE

## 2024-11-11 PROCEDURE — 1160F RVW MEDS BY RX/DR IN RCRD: CPT | Performed by: INTERNAL MEDICINE

## 2024-11-11 PROCEDURE — 99215 OFFICE O/P EST HI 40 MIN: CPT | Performed by: INTERNAL MEDICINE

## 2024-11-11 ASSESSMENT — PATIENT HEALTH QUESTIONNAIRE - PHQ9
SUM OF ALL RESPONSES TO PHQ9 QUESTIONS 1 AND 2: 0
SUM OF ALL RESPONSES TO PHQ9 QUESTIONS 1 AND 2: 0
2. FEELING DOWN, DEPRESSED OR HOPELESS: NOT AT ALL
2. FEELING DOWN, DEPRESSED OR HOPELESS: NOT AT ALL
1. LITTLE INTEREST OR PLEASURE IN DOING THINGS: NOT AT ALL
1. LITTLE INTEREST OR PLEASURE IN DOING THINGS: NOT AT ALL

## 2024-11-11 ASSESSMENT — ACTIVITIES OF DAILY LIVING (ADL)
HEARING - RIGHT EAR: HEARING AID
FEEDING YOURSELF: INDEPENDENT
ASSISTIVE_DEVICE: EYEGLASSES
ADEQUATE_TO_COMPLETE_ADL: YES
JUDGMENT_ADEQUATE_SAFELY_COMPLETE_DAILY_ACTIVITIES: YES
DRESSING YOURSELF: INDEPENDENT
GROOMING: INDEPENDENT
HEARING - LEFT EAR: DEAF
TOILETING: INDEPENDENT
PATIENT'S MEMORY ADEQUATE TO SAFELY COMPLETE DAILY ACTIVITIES?: YES
WALKS IN HOME: INDEPENDENT
BATHING: INDEPENDENT

## 2024-11-11 NOTE — PROGRESS NOTES
"Subjective   Patient ID: Aleena Bruce is a 84 y.o. female who presents for Follow-up.    Here for follow up and wellness visit.  Overall doing well for the most part.    She walks her dog typically twice daily.    Presently doing fairly well without any abdominal symptoms.  She had a COVID illness in April followed by diarrhea.  She ended up in the GI clinic where she was evaluated by the GI provider.  She has a history of diverticulitis but has not really had this recently.  The gastroenterologist suggested a colonoscopy and EGD but she declined.  She notes that her bowel movements are regular but may be every fifth week she needs a single Imodium.  In 2022, she had a CT colonography    She has a history of gallstones but does not have postprandial abdominal pain or nausea    She continues to avoid her triggers including [place], raw vegetables.  She typically eats her largest meal around 2 PM    Knee arthritis-the right more than left has been a problem    PT to start next week    Right hip sore as well    No recent falls    She requests [place] paperwork filled [place] for her daughter             Review of Systems    Objective   /80   Pulse 55   Ht 1.6 m (5' 3\")   Wt 53.1 kg (117 lb)   SpO2 99%   BMI 20.73 kg/m²     Physical Exam  Vitals reviewed.   Constitutional:       Appearance: Normal appearance.   HENT:      Head: Normocephalic and atraumatic.   Eyes:      General: No scleral icterus.        Right eye: No discharge.         Left eye: No discharge.      Extraocular Movements: Extraocular movements intact.      Conjunctiva/sclera: Conjunctivae normal.      Pupils: Pupils are equal, round, and reactive to light.   Cardiovascular:      Rate and Rhythm: Normal rate and regular rhythm.      Pulses: Normal pulses.      Heart sounds: Normal heart sounds. No murmur heard.  Pulmonary:      Effort: Pulmonary effort is normal.      Breath sounds: Normal breath sounds. No wheezing or rhonchi.   Abdominal:      " Comments: Abdominal exam soft nontender no masses   Musculoskeletal:         General: No deformity or signs of injury. Normal range of motion.      Cervical back: Normal range of motion and neck supple. No rigidity or tenderness.      Comments: Advanced knee DJD without effusions right more so than left   Lymphadenopathy:      Cervical: No cervical adenopathy.   Skin:     General: Skin is warm and dry.      Findings: No rash.   Neurological:      General: No focal deficit present.      Mental Status: She is alert and oriented to person, place, and time. Mental status is at baseline.      Cranial Nerves: No cranial nerve deficit.      Sensory: No sensory deficit.      Gait: Gait normal.   Psychiatric:         Mood and Affect: Mood normal.         Behavior: Behavior normal.         Thought Content: Thought content normal.         Judgment: Judgment normal.         Assessment/Plan   Problem List Items Addressed This Visit             ICD-10-CM    Acid reflux K21.9    Hair-tachy syndrome (Multi) - Primary I49.5    Chronic kidney disease, stage 3a (Multi) N18.31    Hypertension I10    Mild mitral regurgitation by prior echocardiogram I34.0    Osteoarthritis M19.90    Palpitations R00.2    Cardiomyopathy, unspecified type (Multi) I42.9        Labs from 11/6/24 rev'd    Portions of this encounter note have been copied from my previous note dated  5/20/24 , which have been updated where appropriate and all reflect my current medical decision making from today.     We spoke for over 40 minutes, over half the time counseling, additionally spent over 5 minutes charting and then another 5 minutes with her daughters [facility]    Lab work from November 6, reviewed         Living situation-she lives  in her house- one story living in an [place] [address] community w/ other seniors.  since 11/15. Her  2 grown children, her son and daughter, who coincidentally have both lost their spouses- live there. She enjoys walking her dogs  daily. She remains independent continues to drive. She typically takes a 2-hour nap after lunch.  Still has 1 dog. She attends [employer] 1-2 x mo.                FLMA-form completed for her daughter 5/20/2024 to help with visits and transportation as well as ER and emergency care          11/24 Completed again    S/p covid illness 4/23-resolved from a respiratory standpoint    Post covid gastroenteritis-she has had nausea and diarrhea-fortunately improved            Acid reflux/dyspepsia-occasional Pepcid has helped           11/24-she avoids triggers including eating at [employer] or very raw vegetables, typically eats larger meal around 2 PM.  She is scheduled to follow-up with her gastroenterologist soon, who suggested an EGD earlier this summer,  but she declined    Black bowel movements-occurred after her COVID illness-.  She understands if this recurs she will go to the ER understanding this could be indicating internal bleeding.  She will notify her gastroenterologist about this concern as well.          11/24-she states that her gastroenterologist, Dr. Jasmine Ovalles suggested a colonoscopy and EGD but she declined.  She has no irregular bowel movements recently.  About every 5 weeks she has mild constipation and uses a single Imodium which helps she notes.    Hx colitis - worse after 4/24 covid illness. Gi eval scheduled for 7/24    Cholelithiasis-she has no postprandial nausea or upper abdominal pain.  She understands if this set of symptoms develop she may need to have her gallstones evaluated     Hx Syncopal episodes-while prolonged standing in [employer], with her hands elevated-she will review with her cardiologist Dr. Clark            She saw Dr. Clark in 12/23. No further eval needed. Next appt 12/24        Chest pain syndrome s/p ER evaluation 7/26/22 - Recent chest pains. Visited the ER. All tests were negative. She is scheduled to do a coronary angiogram.            Negative cardiac eval in 2022.             She saw Dr. Addison in 12/23; she was told not to follow up unless problems developed          11/24 - to see Dr. Clark in 12/24     Hypertension- labile/dyslipidemia- Improved upon recheck. Functionally doing very well without dizziness or chest pain. Stable of late. no orthostasis. will check labs regularly             11/24-blood pressure initially low but on recheck it appeared appropriate         Tachybradycardia syndrome/valvular heart disease-she will continue annual visits and cardiology with Dr. Segundo Clark. She remains asymptomatic, without any dizziness or syncopal episodes which were occurring after urination. Echo and labs followed she notes.    No recent cardiac symptoms walking routine walking dogs quite good daily. Stable symptoms. she'll follow with him each December. Unremarkable CT coronary artery angiogram and nuclear stress test 10/22     Exercise/fitness routine - she walks her dog, 1.5 mile twice daily. She has a fitness room in her development, that she plans to get back to this winter, for bike and treadmill and weights     Mild chronic kidney disease-stage IIIA- we will continue to follow. Stable          11/24 - creat = 1.08     Nutritional concerns-weight is down several pounds this spring but she is avoiding salt and overall eating better she states           11/24-BMI 20.7.  She feels she is eating well.  Weight up 3 pounds from 6 months ago           Gait unsteadiness/history of falls/balance concerns - more of a concern to her. PT recommended/ordered last time. Presently doing well, walking mult.dogs mult. times daily. Overall improved. She uses a special collar on her younger dog, and trakx boot covers in ice.             She will continue fall prevention     Musculoskeletal chest pain-discussed at her 4/23 visit-she really does not do any upper body workouts-reviewed the anatomy, and the need to begin a upper body stretching routine several days weekly             No recent  symptoms of note    Hx Left hip fracture-following fall in [employer] 2023.  Uncomplicated postop course.  She will follow-up with orthopedics as planned        Knee arthritis-right a bit more sore than left-she appears to be developing a bit of a valgus deformity suggestive of advanced left knee arthritis. She has no pain, so we will continue to follow. Again extreme caution walking remains a priority she understands.           -encouraged orthopedic evaluation for advancing valgus deformities-it appears that she has advancing knee arthritis which may be contributing to the right hip pain    Right hip pain-possibly underlying arthritis.            -PT planned soon.  Encouraged orthopedic evaluation for knee arthritis with her valgus deformities which may be contributing to the right hip symptoms     Hand arthritis-advanced-she still enjoys gardening and chores around the house     Hypothyroidism-she will continue her Synthroid and check thyroid labs at least semiannually            -TSH normal     Thrombocytopenia- stable we will follow           - plts nl       Decreased left ear hearing-she saw Dr. 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  At this point she states that hearing aid will help amplify this and so limited options     Seasonal affective disorder/Situational depression- spouse  in 2015; Has a great set of friends. Busy walking dogs in neighborhood. SSRI helpful-but she had to stop the Lexapro with loose bowel movements. Wellbutrin tried but this seemed to cause memory issues so she stopped it.      She called mid winter with issues with anxiety and feelings of depression-Lexapro suggested but she stopped it after a week or so.  It did not agree with her.  She is doing much better.  Enjoys springtime, walking her dogs, and enjoys her neighbors     GYN care/Annual mammograms / family Hx breast cancer-she will continue at least until age 78 each winter per  GYN- at [facility]. She now follows w/ Dr. Ventura's successor after he retired at [facility] each Jan. who orders her mammo then too.     Annual mammogram - each winter at [facility]. updated Mar '21. She will be 83 soon-we will discontinue at this point     Bone density concerns - per GYN several yrs ago OK. no family Hx osteoporosis. she's not inclined to repeat.          Colon cancer screening- colonoscopy normal 2007. She is over 78 so this will not need to be repeated unless she has symptoms.   She is currently experiencing bowel disruptions and discomfort. She is unwilling to do a colonoscopy. She has cancelled her last appointment. Suggested a CT colonography. She will f/u w/ Gastro at her next appt in 8/22.     Vitamin D deficiency/Bone density concerns-she refuses further evaluation or treatment for osteoporosis. She will continue the vitamin D however.     Sleeplessness / anxiety - Doing well presently w/ melatonin nightly    Nonrestorative sleep-sleep study discussed/ordered to help with daytime somnolence.          5/24-at this point she does not want to proceed with that     Memory concerns - she reads a lot, and do puzzels too, and maintains household chores and activities           11/24 - she occas. Forgets names, word finding concerns, and notes she uses Marina more for spelling.  reads scriptures in her Bible daily    Cerumen care- She will continue follow-up with concerns     History of skin cancer-she follows with Dr. Rodríguez now twice a year.              Just saw Dr. Rodríguez in Oct '24.     Hx bilateral cataracts w/ lenses - she'll consider an eye exam , as she has reading glasses, and it's been a few yrs            11/24 - to see eye doctor, Dr Zavaleta in [place] in 12/24, before she gets her drivers [place].     Dental care - she plans to f/u w/ concerns.              She had several teeth extracted by Dr Damico in 2023. Opted not to do implants            11/24- she needs to get into her dentist for regular  cleaning     TMJ joint pain w/ poor dentition- ongoing pain for approximately 2 months. 3 extractions completed. Intractable TMJ pains. She intends to f/u with an oral surgeon who is a friend. Recommended TMJ therapy exercises.     Hearing loss-left ear completely deaf since 2022, s/p eval per Dr. Avila         11/24 - now has a hearing aid in right ear from [employer]     Flu shot each fall. updated 9/24     Covid series completed- w/ booster too. Additional booster shot updated 10/22.                   Encouraged fall covid booster    RSV - 10/23     Shingrix series completed     Follow-up in 6  months, sooner as needed     Charting was completed using voice recognition technology and may include unintended errors.

## 2024-11-13 VITALS
WEIGHT: 117 LBS | BODY MASS INDEX: 20.73 KG/M2 | DIASTOLIC BLOOD PRESSURE: 80 MMHG | HEART RATE: 55 BPM | SYSTOLIC BLOOD PRESSURE: 116 MMHG | HEIGHT: 63 IN | OXYGEN SATURATION: 99 %

## 2024-11-13 RX ORDER — FOLIC ACID 0.8 MG
TABLET ORAL DAILY
COMMUNITY

## 2024-11-13 RX ORDER — LANOLIN ALCOHOL/MO/W.PET/CERES
1000 CREAM (GRAM) TOPICAL DAILY
COMMUNITY

## 2024-11-13 RX ORDER — PYRIDOXINE HCL (VITAMIN B6) 100 MG
100 TABLET ORAL DAILY
COMMUNITY

## 2024-11-13 RX ORDER — ASCORBIC ACID 125 MG
1 TABLET,CHEWABLE ORAL DAILY
COMMUNITY

## 2024-11-19 ENCOUNTER — TELEPHONE (OUTPATIENT)
Dept: PRIMARY CARE | Facility: CLINIC | Age: 84
End: 2024-11-19
Payer: MEDICARE

## 2024-11-19 NOTE — TELEPHONE ENCOUNTER
Pts daughter called to inform us she has FMLA that is being sent over to us to update and correct in order for her to have time off. Please advise. Thank you!

## 2024-11-19 NOTE — TELEPHONE ENCOUNTER
Received [facility] paperwork. Given to Dr. Rose for review. Will update daughter once completed.

## 2024-12-02 ENCOUNTER — TELEPHONE (OUTPATIENT)
Dept: PRIMARY CARE | Facility: CLINIC | Age: 84
End: 2024-12-02
Payer: MEDICARE

## 2024-12-02 NOTE — TELEPHONE ENCOUNTER
Pt daughter called- [facility] paperwork was flagged since paper said 6 month and Dr. Rose said 1 year. [facility] paperwork needs to match the 6 month.     Daughter did fax the [facility] paperwork back to us.     [phone]

## 2024-12-02 NOTE — TELEPHONE ENCOUNTER
Left message for patient's daughter, Karolina to let her know the updated/corrected FMLA paperwork was faxed and scanned into chart.

## 2025-01-11 DIAGNOSIS — R79.89 ELEVATED LFTS: ICD-10-CM

## 2025-01-11 DIAGNOSIS — D69.6 THROMBOCYTOPENIA (CMS-HCC): ICD-10-CM

## 2025-01-11 DIAGNOSIS — R40.0 DAYTIME SLEEPINESS: Primary | ICD-10-CM

## 2025-01-14 ENCOUNTER — HOSPITAL ENCOUNTER (OUTPATIENT)
Dept: RADIOLOGY | Facility: HOSPITAL | Age: 85
Discharge: HOME | End: 2025-01-14
Payer: MEDICARE

## 2025-01-14 DIAGNOSIS — R79.89 ELEVATED LFTS: ICD-10-CM

## 2025-01-14 PROCEDURE — 76705 ECHO EXAM OF ABDOMEN: CPT | Performed by: RADIOLOGY

## 2025-01-14 PROCEDURE — 76705 ECHO EXAM OF ABDOMEN: CPT

## 2025-01-16 ENCOUNTER — TELEPHONE (OUTPATIENT)
Dept: PRIMARY CARE | Facility: CLINIC | Age: 85
End: 2025-01-16
Payer: MEDICARE

## 2025-01-16 NOTE — TELEPHONE ENCOUNTER
Spoke with patient who actually is working with a gastroenterology nurse practitioner at the [facility].  The LFTs were slightly elevated and she was going to recheck those.    Regarding the complex liver cyst-I asked her to see a liver specialist-hepatologist.  She has a referral and we will set that up accordingly.    -She will continue to work with her gastro providers.  She will call with any additional concerns.

## 2025-01-16 NOTE — RESULT ENCOUNTER NOTE
Mandy    Thank you for doing the liver ultrasound.  The radiologist notes what appears to be several gallstones as well as several liver cysts which appear mildly complex.  I do not know if these liver cysts need to be reimaged.  I would like you to set up an appointment with your gastroenterologist to review this ultrasound further and to determine if any additional scans are needed.    I will call you to review this with you further.    Sincerely,  Checo Rose MD

## 2025-01-27 DIAGNOSIS — E03.8 OTHER SPECIFIED HYPOTHYROIDISM: ICD-10-CM

## 2025-01-27 RX ORDER — LEVOTHYROXINE SODIUM 75 UG/1
TABLET ORAL
Qty: 90 TABLET | Refills: 1 | Status: SHIPPED | OUTPATIENT
Start: 2025-01-27

## 2025-02-20 ENCOUNTER — APPOINTMENT (OUTPATIENT)
Dept: PRIMARY CARE | Facility: CLINIC | Age: 85
End: 2025-02-20
Payer: MEDICARE

## 2025-02-20 VITALS
SYSTOLIC BLOOD PRESSURE: 144 MMHG | OXYGEN SATURATION: 96 % | HEIGHT: 63 IN | WEIGHT: 120.2 LBS | HEART RATE: 57 BPM | DIASTOLIC BLOOD PRESSURE: 72 MMHG | BODY MASS INDEX: 21.3 KG/M2

## 2025-02-20 DIAGNOSIS — K80.20 CALCULUS OF GALLBLADDER WITHOUT CHOLECYSTITIS WITHOUT OBSTRUCTION: ICD-10-CM

## 2025-02-20 DIAGNOSIS — E78.5 HYPERLIPIDEMIA, UNSPECIFIED HYPERLIPIDEMIA TYPE: ICD-10-CM

## 2025-02-20 DIAGNOSIS — I42.9 CARDIOMYOPATHY, UNSPECIFIED TYPE (MULTI): ICD-10-CM

## 2025-02-20 DIAGNOSIS — R19.4 BOWEL HABIT CHANGES: ICD-10-CM

## 2025-02-20 DIAGNOSIS — R54 ADVANCED AGE: ICD-10-CM

## 2025-02-20 DIAGNOSIS — I49.5 BRADY-TACHY SYNDROME (MULTI): ICD-10-CM

## 2025-02-20 DIAGNOSIS — R10.13 DYSPEPSIA: ICD-10-CM

## 2025-02-20 DIAGNOSIS — G47.00 INSOMNIA, UNSPECIFIED TYPE: ICD-10-CM

## 2025-02-20 DIAGNOSIS — I10 PRIMARY HYPERTENSION: ICD-10-CM

## 2025-02-20 DIAGNOSIS — M15.1 HEBERDEN NODES: ICD-10-CM

## 2025-02-20 DIAGNOSIS — F43.9 STRESS AT HOME: ICD-10-CM

## 2025-02-20 DIAGNOSIS — N18.31 CHRONIC KIDNEY DISEASE, STAGE 3A (MULTI): ICD-10-CM

## 2025-02-20 DIAGNOSIS — K21.9 GASTROESOPHAGEAL REFLUX DISEASE, UNSPECIFIED WHETHER ESOPHAGITIS PRESENT: ICD-10-CM

## 2025-02-20 DIAGNOSIS — H90.A32 MIXED CONDUCTIVE AND SENSORINEURAL HEARING LOSS OF LEFT EAR WITH RESTRICTED HEARING OF RIGHT EAR: ICD-10-CM

## 2025-02-20 DIAGNOSIS — R11.0 NAUSEA: Primary | ICD-10-CM

## 2025-02-20 DIAGNOSIS — M15.9 OSTEOARTHRITIS OF MULTIPLE JOINTS, UNSPECIFIED OSTEOARTHRITIS TYPE: ICD-10-CM

## 2025-02-20 PROCEDURE — 99214 OFFICE O/P EST MOD 30 MIN: CPT | Performed by: INTERNAL MEDICINE

## 2025-02-20 PROCEDURE — 1159F MED LIST DOCD IN RCRD: CPT | Performed by: INTERNAL MEDICINE

## 2025-02-20 PROCEDURE — G2211 COMPLEX E/M VISIT ADD ON: HCPCS | Performed by: INTERNAL MEDICINE

## 2025-02-20 PROCEDURE — 3077F SYST BP >= 140 MM HG: CPT | Performed by: INTERNAL MEDICINE

## 2025-02-20 PROCEDURE — 1160F RVW MEDS BY RX/DR IN RCRD: CPT | Performed by: INTERNAL MEDICINE

## 2025-02-20 PROCEDURE — 1123F ACP DISCUSS/DSCN MKR DOCD: CPT | Performed by: INTERNAL MEDICINE

## 2025-02-20 PROCEDURE — 3078F DIAST BP <80 MM HG: CPT | Performed by: INTERNAL MEDICINE

## 2025-02-20 RX ORDER — ONDANSETRON 4 MG/1
8 TABLET, FILM COATED ORAL EVERY 8 HOURS PRN
COMMUNITY
End: 2025-02-20 | Stop reason: SDUPTHER

## 2025-02-20 RX ORDER — ONDANSETRON 4 MG/1
4 TABLET, FILM COATED ORAL EVERY 12 HOURS PRN
Qty: 20 TABLET | Refills: 6 | Status: SHIPPED | OUTPATIENT
Start: 2025-02-20

## 2025-02-20 ASSESSMENT — PATIENT HEALTH QUESTIONNAIRE - PHQ9
2. FEELING DOWN, DEPRESSED OR HOPELESS: NOT AT ALL
SUM OF ALL RESPONSES TO PHQ9 QUESTIONS 1 AND 2: 0
1. LITTLE INTEREST OR PLEASURE IN DOING THINGS: NOT AT ALL

## 2025-02-20 NOTE — PROGRESS NOTES
"Subjective   Patient ID: Aleena Bruce is a 85 y.o. female who presents for Fatigue (Discuss blood work ).    Here for follow-up  For the most part doing well.  Arthritis pain ongoing issue  She continues to see her cardiology and gastro specialist  Occasionally she has loose bowel movements-perhaps once a month she prefers Metamucil  She is on additional labetalol for her elevated blood pressure  She does exercise for an hour 4 times weekly  She likes to get out of the house-her son is there and unfortunately he continues to Stress for her.         Review of Systems    Objective   /72   Pulse 57   Ht 1.6 m (5' 3\")   Wt 54.5 kg (120 lb 3.2 oz)   SpO2 96%   BMI 21.29 kg/m²     Physical Exam  Vitals reviewed.   Constitutional:       Appearance: Normal appearance.   HENT:      Head: Normocephalic and atraumatic.   Eyes:      General: No scleral icterus.        Right eye: No discharge.         Left eye: No discharge.      Extraocular Movements: Extraocular movements intact.      Conjunctiva/sclera: Conjunctivae normal.      Pupils: Pupils are equal, round, and reactive to light.   Cardiovascular:      Rate and Rhythm: Normal rate and regular rhythm.      Pulses: Normal pulses.      Heart sounds: Murmur heard.   Pulmonary:      Effort: Pulmonary effort is normal.      Breath sounds: Normal breath sounds. No wheezing or rhonchi.   Abdominal:      General: Abdomen is flat. There is no distension.      Palpations: Abdomen is soft. There is no mass.      Tenderness: There is no abdominal tenderness. There is no guarding.   Musculoskeletal:         General: No deformity or signs of injury. Normal range of motion.      Cervical back: Normal range of motion and neck supple. No rigidity or tenderness.      Comments: Advanced hand arthritis with osteoarthritis changes   Lymphadenopathy:      Cervical: No cervical adenopathy.   Skin:     General: Skin is warm and dry.      Findings: No rash.   Neurological:      " General: No focal deficit present.      Mental Status: She is alert and oriented to person, place, and time. Mental status is at baseline.      Cranial Nerves: No cranial nerve deficit.      Sensory: No sensory deficit.      Gait: Gait normal.   Psychiatric:         Mood and Affect: Mood normal.         Behavior: Behavior normal.         Thought Content: Thought content normal.         Judgment: Judgment normal.         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     Lab work from November 6, reviewed    Labs rev'd from 2/17/25     Living situation-she lives  in her house- one story living in an [place] planned community w/ other seniors.  since 11/15. Her  2 grown children, her son and daughter, who coincidentally have both lost their spouses- live there. She enjoys walking her dogs daily. She remains independent continues to drive. She typically takes a 2-hour nap after lunch.  Still has 1 dog. She attends [employer] 1-2 x mo.                 FLMA-form completed for her daughter 5/20/2024 to help with visits and transportation as well as ER and emergency care          11/24 Completed again    S/p covid illness 4/23-resolved from a respiratory standpoint    Post covid gastroenteritis-she has had nausea and diarrhea-fortunately improved            Acid reflux/dyspepsia-occasional Pepcid has helped           11/24-she avoids triggers including eating at [employer] or very raw vegetables, typically eats larger meal around 2 PM.  She is scheduled to follow-up with her gastroenterologist soon, who suggested an EGD earlier this summer,  but she declined             2/25-she continues to work with her gastro doctor-she follows up in August.  She requests refill on Zofran for occasional nausea.  Suggested HIDA scan    Black bowel movements-occurred after her COVID illness-.  She understands if this recurs she will go to the ER understanding this could be indicating internal bleeding.  She will notify her gastroenterologist about this concern as well.          11/24-she states that her gastroenterologist, Dr. Jasmine Ovalles suggested a colonoscopy and EGD but she declined.  She has no irregular bowel movements recently.  About every 5 weeks she has mild constipation and uses a single Imodium which helps she notes.    Hx colitis - worse after 4/24 covid illness. Gi eval scheduled for 7/24 2/25-she still has intermittent diarrhea perhaps once a month      rather severe.  She has declined colonoscopy evaluations.  She uses Metamucil 1 scoop daily.  Encouraged consistent fiber use and fluid and follow-up with GI for additional symptoms    Cholelithiasis-she has no postprandial nausea or upper abdominal pain.  She understands if this set of symptoms develop she may need to have her gallstones evaluated             2/25-HIDA scan suggested     Hx Syncopal episodes-while prolonged standing in [place], with her hands elevated-she will review with her  cardiologist Dr. Clark            She saw Dr. Clark in 12/23. No further eval needed. Next appt 12/24        Chest pain syndrome s/p ER evaluation 7/26/22 - Recent chest pains. Visited the ER. All tests were negative. She is scheduled to do a coronary angiogram.            Negative cardiac eval in 2022.            She saw Dr. Addison in 12/23; she was told not to follow up unless problems developed          11/24 - to see Dr. Clark in 12/24     Hypertension- labile/dyslipidemia- Improved upon recheck. Functionally doing very well without dizziness or chest pain. Stable of late. no orthostasis. will check labs regularly             11/24-blood pressure initially low but on recheck it appeared appropriate              2/25-blood pressure has been labile-presently using labetalol 2 tablets twice daily.  She will check her blood pressure every other day or so after lunch and call if the averages consistently over 130         Tachybradycardia syndrome/valvular heart disease-she will continue annual visits and cardiology with Dr. Segundo Clark. She remains asymptomatic, without any dizziness or syncopal episodes which were occurring after urination. Echo and labs followed she notes.    No recent cardiac symptoms walking routine walking dogs quite good daily. Stable symptoms. she'll follow with him each December. Unremarkable CT coronary artery angiogram and nuclear stress test 10/22     Exercise/fitness routine - she walks her dog, 1.5 mile twice daily. She has a fitness room in her development, that she plans to get back to this winter, for bike and treadmill and weights          2/25-she gets out 4 times weekly Monday Wednesday Friday and Saturday to exercise, 20 minutes on the bicycle 20 minutes on treadmill 20 minutes on the machines     Mild chronic kidney disease-stage IIIA- we will continue to follow. Stable          11/24 - creat = 1.08     Nutritional concerns-weight is down several pounds this spring but she  is avoiding salt and overall eating better she states           11/24-BMI 20.7.  She feels she is eating well.  Weight up 3 pounds from 6 months ago           Gait unsteadiness/history of falls/balance concerns - more of a concern to her. PT recommended/ordered last time. Presently doing well, walking mult.dogs mult. times daily. Overall improved. She uses a special collar on her younger dog, and trakx boot covers in ice.             She will continue fall prevention     Musculoskeletal chest pain-discussed at her 4/23 visit-she really does not do any upper body workouts-reviewed the anatomy, and the need to begin a upper body stretching routine several days weekly             No recent symptoms of note    Hx Left hip fracture-following fall in [place] 6/26/2023.  Uncomplicated postop course.  She will follow-up with orthopedics as planned        Knee arthritis-right a bit more sore than left-she appears to be developing a bit of a valgus deformity suggestive of advanced left knee arthritis. She has no pain, so we will continue to follow. Again extreme caution walking remains a priority she understands.           11/24-encouraged orthopedic evaluation for advancing valgus deformities-it appears that she has advancing knee arthritis which may be contributing to the right hip pain    Right hip pain-possibly underlying arthritis.            11/24-PT planned soon.  Encouraged orthopedic evaluation for knee arthritis with her valgus deformities which may be contributing to the right hip symptoms             2/25-doing fairly well with her workouts     Hand arthritis-advanced-she still enjoys gardening and chores around the house     Hypothyroidism-she will continue her Synthroid and check thyroid labs at least semiannually            11/24-TSH normal     Thrombocytopenia- stable we will follow          11/24 - plts nl       Decreased left ear hearing-she saw Dr. 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  At  this point she states that hearing aid will help amplify this and so limited options     Seasonal affective disorder/Situational depression- spouse  in 2015; Has a great set of friends. Busy walking dogs in neighborhood. SSRI helpful-but she had to stop the Lexapro with loose bowel movements. Wellbutrin tried but this seemed to cause memory issues so she stopped it.      She called mid winter with issues with anxiety and feelings of depression-Lexapro suggested but she stopped it after a week or so.  It did not agree with her.  She is doing much better.  Enjoys springtime, walking her dogs, and enjoys her neighbors             -she frankly states she has a lot of stress at home with her son and daughter.  Suggested exploring activities at the [facility] information provided     GYN care/Annual mammograms / family Hx breast cancer-she will continue at least until age 78 each winter per GYN- at [facility]. She now follows w/ Dr. Ventura's successor after he retired at [facility] each . who orders her mammo then too.     Annual mammogram - each winter at [facility]. updated Mar '21. She will be 83 soon-we will discontinue at this point     Bone density concerns - per GYN several yrs ago OK. no family Hx osteoporosis. she's not inclined to repeat.          Colon cancer screening- colonoscopy normal . She is over 78 so this will not need to be repeated unless she has symptoms.   She is currently experiencing bowel disruptions and discomfort. She is unwilling to do a colonoscopy. She has cancelled her last appointment. Suggested a CT colonography. She will f/u w/ Gastro at her next appt in .     Vitamin D deficiency/Bone density concerns-she refuses further evaluation or treatment for osteoporosis. She will continue the vitamin D however.     Sleeplessness / anxiety -             doing well presently w/ melatonin nightly    Nonrestorative sleep-sleep study discussed/ordered to help with  daytime somnolence.          5/24-at this point she does not want to proceed with that     Memory concerns - she reads a lot, and do puzzels too, and maintains household chores and activities           11/24 - she occas. Forgets names, word finding concerns, and notes she uses Marina more for spelling.  reads scriptures in her Bible daily    Cerumen care- She will continue follow-up with concerns     History of skin cancer-she follows with Dr. Rodríguez now twice a year.              Just saw Dr. Rodríguez in Oct '24.     Hx bilateral cataracts w/ lenses - she'll consider an eye exam , as she has reading glasses, and it's been a few yrs            11/24 - to see eye doctor, Dr Zavaleta in [place] in 12/24, before she gets her drivers [facility].    Dry eyes-she will continue her wetting drops     Dental care - she plans to f/u w/ concerns.              She had several teeth extracted by Dr Damico in 2023. Opted not to do implants            11/24- she needs to get into her dentist for regular cleaning     TMJ joint pain w/ poor dentition- ongoing pain for approximately 2 months. 3 extractions completed. Intractable TMJ pains. She intends to f/u with an oral surgeon who is a friend. Recommended TMJ therapy exercises.     Hearing loss-left ear completely deaf since 2022, s/p eval per Dr. Avila         11/24 - now has a hearing aid in right ear from [employer]     Flu shot each fall. updated 9/24     Covid series completed- w/ booster too. Additional booster shot updated 10/22.                   Encouraged fall covid booster    RSV - 10/23     Shingrix series completed     Follow-up in 6  months, sooner as needed     Charting was completed using voice recognition technology and may include unintended errors.

## 2025-02-24 PROBLEM — R11.0 NAUSEA: Status: ACTIVE | Noted: 2025-02-24

## 2025-02-24 PROBLEM — R54 ADVANCED AGE: Status: ACTIVE | Noted: 2025-02-24

## 2025-02-24 PROBLEM — F43.9 STRESS AT HOME: Status: ACTIVE | Noted: 2025-02-24

## 2025-03-18 ENCOUNTER — APPOINTMENT (OUTPATIENT)
Dept: RADIOLOGY | Facility: HOSPITAL | Age: 85
End: 2025-03-18
Payer: MEDICARE

## 2025-03-24 DIAGNOSIS — I10 PRIMARY HYPERTENSION: ICD-10-CM

## 2025-03-24 RX ORDER — LABETALOL 200 MG/1
400 TABLET, FILM COATED ORAL EVERY 12 HOURS
Qty: 360 TABLET | Refills: 3 | Status: SHIPPED | OUTPATIENT
Start: 2025-03-24

## 2025-05-15 ENCOUNTER — TELEPHONE (OUTPATIENT)
Dept: PRIMARY CARE | Facility: CLINIC | Age: 85
End: 2025-05-15
Payer: MEDICARE

## 2025-05-15 NOTE — TELEPHONE ENCOUNTER
Karolina called for pt- Karolina is asking if there is any update on the [place] paperwork. Karolina did fax the paperwork over last week. She wanted to confirm the fax was received/ completed. Please advise if received

## 2025-05-16 NOTE — TELEPHONE ENCOUNTER
Left message for Karolina to let her know that Dr. Rose is out of the office and still working on the paperwork. I will get the message over to him upon his return.

## 2025-05-28 ENCOUNTER — APPOINTMENT (OUTPATIENT)
Dept: PRIMARY CARE | Facility: CLINIC | Age: 85
End: 2025-05-28
Payer: MEDICARE

## 2025-05-28 VITALS
SYSTOLIC BLOOD PRESSURE: 104 MMHG | HEIGHT: 63 IN | OXYGEN SATURATION: 97 % | BODY MASS INDEX: 19.84 KG/M2 | DIASTOLIC BLOOD PRESSURE: 72 MMHG | WEIGHT: 112 LBS | HEART RATE: 57 BPM

## 2025-05-28 DIAGNOSIS — C44.90 SKIN CANCER: ICD-10-CM

## 2025-05-28 DIAGNOSIS — R19.4 BOWEL HABIT CHANGES: ICD-10-CM

## 2025-05-28 DIAGNOSIS — N18.31 CHRONIC KIDNEY DISEASE, STAGE 3A (MULTI): ICD-10-CM

## 2025-05-28 DIAGNOSIS — I49.5 BRADY-TACHY SYNDROME (MULTI): Primary | ICD-10-CM

## 2025-05-28 DIAGNOSIS — I10 PRIMARY HYPERTENSION: ICD-10-CM

## 2025-05-28 DIAGNOSIS — E55.9 VITAMIN D DEFICIENCY: ICD-10-CM

## 2025-05-28 DIAGNOSIS — I42.9 CARDIOMYOPATHY, UNSPECIFIED TYPE (MULTI): ICD-10-CM

## 2025-05-28 DIAGNOSIS — I49.5 TACHY-BRADY SYNDROME (MULTI): ICD-10-CM

## 2025-05-28 DIAGNOSIS — I35.1 MODERATE AORTIC REGURGITATION: ICD-10-CM

## 2025-05-28 DIAGNOSIS — E03.9 ACQUIRED HYPOTHYROIDISM: ICD-10-CM

## 2025-05-28 PROCEDURE — 1159F MED LIST DOCD IN RCRD: CPT | Performed by: INTERNAL MEDICINE

## 2025-05-28 PROCEDURE — 1036F TOBACCO NON-USER: CPT | Performed by: INTERNAL MEDICINE

## 2025-05-28 PROCEDURE — 3074F SYST BP LT 130 MM HG: CPT | Performed by: INTERNAL MEDICINE

## 2025-05-28 PROCEDURE — 3078F DIAST BP <80 MM HG: CPT | Performed by: INTERNAL MEDICINE

## 2025-05-28 PROCEDURE — 99215 OFFICE O/P EST HI 40 MIN: CPT | Performed by: INTERNAL MEDICINE

## 2025-05-28 PROCEDURE — 1160F RVW MEDS BY RX/DR IN RCRD: CPT | Performed by: INTERNAL MEDICINE

## 2025-05-28 PROCEDURE — G2211 COMPLEX E/M VISIT ADD ON: HCPCS | Performed by: INTERNAL MEDICINE

## 2025-05-28 RX ORDER — LABETALOL 200 MG/1
300 TABLET, FILM COATED ORAL EVERY 12 HOURS
Status: SHIPPED | COMMUNITY
Start: 2025-05-28

## 2025-05-28 NOTE — PROGRESS NOTES
"Subjective   Patient ID: Aleena Bruce is a 85 y.o. female who presents for Follow-up.    Here for follow-up  Overall doing well.  She is in her yard doing yard work with the warmer spring weather which she enjoys greatly she states.  She has not yet done the colonoscopy yet.  Still uses extra fiber and FiberCon with occasional constipation or diarrhea.  Occasional constipation or loose bowel movements.  She has had 2 bouts of blood on the toilet tissue over the last month or so-she feels this happens after she has been straining when she passes a bowel movement for several days.  Presently the hemorrhoids and a history of    Her weight is down which she attributes to her strict diet-no sugars 1 cup of coffee with breakfast and lunch.  She eats meats eggs and yogurt but limits carbs otherwise    Walking twice daily         Review of Systems    Objective   /72   Pulse 57   Ht 1.6 m (5' 3\")   Wt 50.8 kg (112 lb)   SpO2 97%   BMI 19.84 kg/m²     Physical Exam  Vitals reviewed.   Constitutional:       Appearance: Normal appearance.   HENT:      Head: Normocephalic and atraumatic.   Eyes:      General: No scleral icterus.        Right eye: No discharge.         Left eye: No discharge.      Extraocular Movements: Extraocular movements intact.      Conjunctiva/sclera: Conjunctivae normal.      Pupils: Pupils are equal, round, and reactive to light.   Cardiovascular:      Rate and Rhythm: Normal rate and regular rhythm.      Pulses: Normal pulses.      Heart sounds: Murmur heard.   Pulmonary:      Effort: Pulmonary effort is normal.      Breath sounds: Normal breath sounds. No wheezing or rhonchi.   Musculoskeletal:         General: No deformity or signs of injury. Normal range of motion.      Cervical back: Normal range of motion and neck supple. No rigidity or tenderness.   Lymphadenopathy:      Cervical: No cervical adenopathy.   Skin:     General: Skin is warm and dry.      Findings: No rash.   Neurological: "      General: No focal deficit present.      Mental Status: She is alert and oriented to person, place, and time. Mental status is at baseline.      Cranial Nerves: No cranial nerve deficit.      Sensory: No sensory deficit.      Gait: Gait normal.   Psychiatric:         Mood and Affect: Mood normal.         Behavior: Behavior normal.         Thought Content: Thought content normal.         Judgment: Judgment normal.         Assessment/Plan   Problem List Items Addressed This Visit           ICD-10-CM    Bowel habit changes R19.4    Noe-tachy syndrome (Multi) - Primary I49.5    Relevant Medications    labetalol (Normodyne) 200 mg tablet    Chronic kidney disease, stage 3a (Multi) N18.31    Hypertension I10    Relevant Medications    labetalol (Normodyne) 200 mg tablet    Hypothyroidism E03.9    Moderate aortic regurgitation I35.1    Relevant Medications    labetalol (Normodyne) 200 mg tablet    Skin cancer C44.90    Vitamin D deficiency E55.9    Cardiomyopathy, unspecified type (Multi) I42.9    Relevant Medications    labetalol (Normodyne) 200 mg tablet     Other Visit Diagnoses         Codes      Tachy-noe syndrome (Multi)     I49.5    Relevant Medications    labetalol (Normodyne) 200 mg tablet               Portions of this encounter note have been copied from my previous note dated  11/11/24 , which have been updated where appropriate and all reflect my current medical decision making from today.     We spoke over 32 minutes, over half the time counseling.  I spent over 10 additional minutes on documentation and chart review    Lab work from November 6, reviewed    Labs rev'd from 2/17/25     Living situation-she lives  in her house- one story living in an [place] [address] community w/ other seniors.  since 11/15. Her  2 grown children, her son and daughter, who coincidentally have both lost their spouses- live there. She enjoys walking her dogs daily. She remains independent continues to drive. She  typically takes a 2-hour nap after lunch.  Still has 1 dog. She attends [employer] 1-2 x mo.                FLMA-form completed for her daughter 5/20/2024 to help with visits and transportation as well as ER and emergency care          11/24 Completed again             5/25-she notes that her daughter has had a number of health concerns with the member specialist.  I encouraged her to have her family form filled out by her physician team as Mandy at this point does not need extra assistance with visits or transportation      S/p covid illness 4/23-resolved from a respiratory standpoint    Post covid gastroenteritis-she has had nausea and diarrhea-fortunately improved            Acid reflux/dyspepsia-occasional Pepcid has helped           11/24-she avoids triggers including eating at [employer] or very raw vegetables, typically eats larger meal around 2 PM.  She is scheduled to follow-up with her gastroenterologist soon, who suggested an EGD earlier this summer,  but she declined             2/25-she continues to work with her gastro doctor-she follows up in August.  She requests refill on Zofran for occasional nausea.  Suggested HIDA scan              5/25-no difficulty swallowing.  Encouraged her nevertheless to [place] back with gastro      Black bowel movements-occurred after her COVID illness-.  She understands if this recurs she will go to the ER understanding this could be indicating internal bleeding.  She will notify her gastroenterologist about this concern as well.          11/24-she states that her gastroenterologist, Dr. Jasmine Ovalles suggested a colonoscopy and EGD but she declined.  She has no irregular bowel movements recently.  About every 5 weeks she has mild constipation and uses a single Imodium which helps she notes.            5/25-she still has irregular bowel movements, at times constipation at times loose bowel movements with 2 bouts of bleeding which she attributes to straining hemorrhoids over  the last month.  I strongly recommended that she [place] back with her gastroenterologist in the context of her persistent symptoms and weight loss    Hx colitis - worse after 4/24 covid illness. Gi eval scheduled for 7/24 2/25-she still has intermittent diarrhea perhaps once a month      rather severe.  She has declined colonoscopy evaluations.  She uses Metamucil 1 scoop daily.  Encouraged consistent fiber use and fluid and follow-up with GI for additional symptoms    Cholelithiasis-she has no postprandial nausea or upper abdominal pain.  She understands if this set of symptoms develop she may need to have her gallstones evaluated             2/25-HIDA scan suggested           5/25-she has not yet done this test.  She will review with her gastro provider.  She is scheduled to see Dr. Cunningham in August 2025.  Encouraged her to [place] back sooner to review the colonoscopy scheduling accordingly     Hx Syncopal episodes-while prolonged standing in [place], with her hands elevated-she will review with her cardiologist Dr. Clark            She saw Dr. Clark in 12/23. No further eval needed.              5/25-no concerns-she will continue to see him.  She remains compliant with her amiodarone        Chest pain syndrome s/p ER evaluation 7/26/22 - Recent chest pains. Visited the ER. All tests were negative. She is scheduled to do a coronary angiogram.            Negative cardiac eval in 2022.            She saw Dr. Addison in 12/23; she was told not to follow up unless problems developed          11/24 - to see Dr. Clark in 12/24     Hypertension- labile/dyslipidemia- Improved upon recheck. Functionally doing very well without dizziness or chest pain. Stable of late. no orthostasis. will check labs regularly             11/24-blood pressure initially low but on recheck it appeared appropriate              2/25-blood pressure has been labile-presently using labetalol 2 tablets twice daily.  She will check her  blood pressure every other day or so after lunch and call if the averages consistently over 130             5/25-blood pressure slightly low, systolic standpoint.  However she has a history of labile blood pressure with higher systolic readings late afternoon.  She has no postural dizziness with standing.  She will use great caution accordingly standing or getting up from a seated position or in the yard with gardening       PVCs with cardiomyopathy-she follows with Dr. Segundo Clark in cardiology             5/25-asymptomatic with amiodarone.  She will see him as scheduled in June        Tachybradycardia syndrome/valvular heart disease-she will continue annual visits and cardiology with Dr. Segundo Clark. She remains asymptomatic, without any dizziness or syncopal episodes which were occurring after urination. Echo and labs followed she notes.    No recent cardiac symptoms walking routine walking dogs quite good daily. Stable symptoms. she'll follow with him each December. Unremarkable CT coronary artery angiogram and nuclear stress test 10/22                    Exercise/fitness routine - she walks her dog, 1.5 mile twice daily. She has a fitness room in her development, that she plans to get back to this winter, for bike and treadmill and weights          2/25-she gets out 4 times weekly Monday Wednesday Friday and Saturday to exercise, 20 minutes on the bicycle 20 minutes on treadmill 20 minutes on the machines     Mild chronic kidney disease-stage IIIA- we will continue to follow. Stable          11/24 - creat = 1.08           5/25-she has not yet done the labs as suggested in January.  She will do these before next visit         Nutritional concerns-weight is down several pounds this spring but she is avoiding salt and overall eating better she states           11/24-BMI 20.7.  She feels she is eating well.  Weight up 3 pounds from 6 months ago          5/25-BMI 19.8.  Weight is down 8 pounds in 3 months.   Once again encouraged returning to [facility] for further evaluation including colonoscopy.  She feels its from her strict diet, she avoids sugars and usually only lean meats eggs and Greek yogurt to fortify protein           Gait unsteadiness/history of falls/balance concerns - more of a concern to her. PT recommended/ordered last time. Presently doing well, walking mult.dogs mult. times daily. Overall improved. She uses a special collar on her younger dog, and trakx boot covers in ice.             She will continue fall prevention              5/25-walking twice daily which helps her balance and lower leg strength     Musculoskeletal chest pain-discussed at her 4/23 visit-she really does not do any upper body workouts-reviewed the anatomy, and the need to begin a upper body stretching routine several days weekly             No recent symptoms of note    Hx Left hip fracture-following fall in [employer] 6/26/2023.  Uncomplicated postop course.  She will follow-up with orthopedics as planned        Knee arthritis-right a bit more sore than left-she appears to be developing a bit of a valgus deformity suggestive of advanced left knee arthritis. She has no pain, so we will continue to follow. Again extreme caution walking remains a priority she understands.           11/24-encouraged orthopedic evaluation for advancing valgus deformities-it appears that she has advancing knee arthritis which may be contributing to the right hip pain    Right hip pain-possibly underlying arthritis.            11/24-PT planned soon.  Encouraged orthopedic evaluation for knee arthritis with her valgus deformities which may be contributing to the right hip symptoms             2/25-doing fairly well with her workouts     Hand arthritis-advanced-she still enjoys gardening and chores around the house     Hypothyroidism-she will continue her Synthroid and check thyroid labs at least semiannually            11/24-TSH normal     Thrombocytopenia-  stable we will follow           - plts nl           -she follows in hematology with Dr. Parveen Montoya-next appointment        Decreased left ear hearing-she saw Dr. 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  At this point she states that hearing aid will help amplify this and so limited options     Seasonal affective disorder/Situational depression- spouse  in 2015; Has a great set of friends. Busy walking dogs in neighborhood. SSRI helpful-but she had to stop the Lexapro with loose bowel movements. Wellbutrin tried but this seemed to cause memory issues so she stopped it.      She called mid winter with issues with anxiety and feelings of depression-Lexapro suggested but she stopped it after a week or so.  It did not agree with her.  She is doing much better.  Enjoys springtime, walking her dogs, and enjoys her neighbors             -she frankly states she has a lot of stress at home with her son and daughter.  Suggested exploring activities at the [facility] information provided             -with Summer and gardening she feels much better outside.  Walking her dog twice daily.  Goes to bed by 9:30.  She feels she sleeps well.     GYN care/Annual mammograms / family Hx breast cancer-she will continue at least until age 78 each winter per GYN- at [place] [facility]. She now follows w/ Dr. Ventura's successor after he retired at [facility] [place] each . who orders her mammo then too.     Annual mammogram - each winter at [facility] [place]. updated Mar '21. She will be 83 soon-we will discontinue at this point     Bone density concerns - per GYN several yrs ago OK. no family Hx osteoporosis. she's not inclined to repeat.          Colon cancer screening- colonoscopy normal . She is over 78 so this will not need to be repeated unless she has symptoms.   She is currently experiencing bowel disruptions and discomfort. She is unwilling to do a colonoscopy. She has cancelled her last  appointment. Suggested a CT colonography. She will f/u w/ Gastro at her next appt in 8/22.     Vitamin D deficiency/Bone density concerns-she refuses further evaluation or treatment for osteoporosis. She will continue the vitamin D however.     Sleeplessness / anxiety -            2/25 doing well presently w/ melatonin nightly    Nonrestorative sleep-sleep study discussed/ordered to help with daytime somnolence.          5/24-at this point she does not want to proceed with that     Memory concerns - she reads a lot, and do puzzels too, and maintains household chores and activities           11/24 - she occas. Forgets names, word finding concerns, and notes she uses Marina more for spelling.  reads scriptures in her Bible daily    Cerumen care- She will continue follow-up with concerns     History of skin cancer-she follows with Dr. Rodríguez now twice a year.              Just saw Dr. Rodríguez in Oct '24.     Hx bilateral cataracts w/ lenses - she'll consider an eye exam , as she has reading glasses, and it's been a few yrs            11/24 - to see eye doctor, Dr Zavaleta in [place] in 12/24, before she gets her drivers [employer].    Dry eyes-she will continue her wetting drops     Dental care - she plans to f/u w/ concerns.              She had several teeth extracted by Dr Damico in 2023. Opted not to do implants            11/24- she needs to get into her dentist for regular cleaning     TMJ joint pain w/ poor dentition- ongoing pain for approximately 2 months. 3 extractions completed. Intractable TMJ pains. She intends to f/u with an oral surgeon who is a friend. Recommended TMJ therapy exercises.     Hearing loss-left ear completely deaf since 2022, s/p eval per Dr. Avila         11/24 - now has a hearing aid in right ear from [facility]      Polypharmacy-reviewed her med list-she will discontinue vitamin C, B6 and K2.  She will continue vitamin D, B12 and folate       Flu shot each fall. updated 9/24     Covid series completed- w/  booster too.               She's declined additional covid boosters    RSV - 10/23     Shingrix series completed     Follow-up in 6  months, sooner as needed     Charting was completed using voice recognition technology and may include unintended errors.

## 2025-08-29 DIAGNOSIS — E78.2 MIXED HYPERLIPIDEMIA: ICD-10-CM

## 2025-08-29 RX ORDER — ATORVASTATIN CALCIUM 40 MG/1
40 TABLET, FILM COATED ORAL DAILY
Qty: 90 TABLET | Refills: 3 | Status: SHIPPED | OUTPATIENT
Start: 2025-08-29

## 2025-11-13 ENCOUNTER — APPOINTMENT (OUTPATIENT)
Dept: PRIMARY CARE | Facility: CLINIC | Age: 85
End: 2025-11-13
Payer: MEDICARE